# Patient Record
Sex: MALE | Race: WHITE | Employment: FULL TIME | URBAN - METROPOLITAN AREA
[De-identification: names, ages, dates, MRNs, and addresses within clinical notes are randomized per-mention and may not be internally consistent; named-entity substitution may affect disease eponyms.]

---

## 2021-08-03 ENCOUNTER — OFFICE VISIT (OUTPATIENT)
Dept: PRIMARY CARE CLINIC | Age: 56
End: 2021-08-03
Payer: COMMERCIAL

## 2021-08-03 VITALS
SYSTOLIC BLOOD PRESSURE: 134 MMHG | OXYGEN SATURATION: 98 % | BODY MASS INDEX: 26.82 KG/M2 | WEIGHT: 198 LBS | DIASTOLIC BLOOD PRESSURE: 92 MMHG | HEART RATE: 87 BPM | TEMPERATURE: 98.2 F | HEIGHT: 72 IN

## 2021-08-03 DIAGNOSIS — S82.55XA NONDISPLACED FRACTURE OF MEDIAL MALLEOLUS OF LEFT TIBIA, INITIAL ENCOUNTER FOR CLOSED FRACTURE: Primary | ICD-10-CM

## 2021-08-03 DIAGNOSIS — S99.912A INJURY OF LEFT ANKLE, INITIAL ENCOUNTER: ICD-10-CM

## 2021-08-03 DIAGNOSIS — S89.92XA INJURY OF LEFT LOWER EXTREMITY, INITIAL ENCOUNTER: ICD-10-CM

## 2021-08-03 PROCEDURE — 99203 OFFICE O/P NEW LOW 30 MIN: CPT | Performed by: NURSE PRACTITIONER

## 2021-08-03 PROCEDURE — 29515 APPLICATION SHORT LEG SPLINT: CPT | Performed by: NURSE PRACTITIONER

## 2021-08-03 ASSESSMENT — PATIENT HEALTH QUESTIONNAIRE - PHQ9
SUM OF ALL RESPONSES TO PHQ QUESTIONS 1-9: 0
SUM OF ALL RESPONSES TO PHQ QUESTIONS 1-9: 0
SUM OF ALL RESPONSES TO PHQ9 QUESTIONS 1 & 2: 0
2. FEELING DOWN, DEPRESSED OR HOPELESS: 0
SUM OF ALL RESPONSES TO PHQ QUESTIONS 1-9: 0
1. LITTLE INTEREST OR PLEASURE IN DOING THINGS: 0

## 2021-08-03 ASSESSMENT — ENCOUNTER SYMPTOMS
CHEST TIGHTNESS: 0
RESPIRATORY NEGATIVE: 1
SHORTNESS OF BREATH: 0
COUGH: 0
WHEEZING: 0

## 2021-08-03 NOTE — PROGRESS NOTES
[facility] [address] [facility] WALK IN CARE  [address] [address]  Dept: [phone]  Dept Fax: [phone]     Jam Ewing is a 50 y.o. male who presents to the urgent care today for his medicalconditions/complaints as noted below. Jam Ewing is c/o of Other (swelling and pain from the left knee down )    HPI:      Ankle Pain   Incident onset: 10am today. The injury mechanism was a fall (leg folded under him from a fall). The pain is present in the left ankle and left leg. The pain is moderate. Associated symptoms include an inability to bear weight. Pertinent negatives include no numbness or tingling. The symptoms are aggravated by movement, palpation and weight bearing. He has tried ice for the symptoms. The treatment provided no relief. Past Medical History:   Diagnosis Date    Anxiety       No current outpatient medications on file. No current facility-administered medications for this visit. No Known Allergies    Reviewed PMH, SH, and  with the patient and updated. Subjective:      Review of Systems   Constitutional: Negative for chills, fatigue and fever. Respiratory: Negative. Negative for cough, chest tightness, shortness of breath and wheezing. Cardiovascular: Negative. Negative for chest pain. Musculoskeletal: Positive for arthralgias (left ankle, left leg), gait problem and joint swelling (left ankle, left lower leg). Skin: Negative for rash. Neurological: Negative for tingling and numbness. All other systems reviewed and are negative. Objective:      Physical Exam  Vitals and nursing note reviewed. Constitutional:       General: He is not in acute distress. Appearance: He is well-developed. He is not diaphoretic. HENT:      Head: Normocephalic and atraumatic. Cardiovascular:      Rate and Rhythm: Normal rate and regular rhythm. Heart sounds: Normal heart sounds. No murmur heard.      Pulmonary:      Effort:

## 2021-08-03 NOTE — PATIENT INSTRUCTIONS
Patient Education        Wearing a Splint: Care Instructions  Your Care Instructions     A splint protects a broken bone or other injury. If you have a removable splint, follow your doctor's instructions and only remove the splint if your doctor says it's okay. Most splints can be adjusted. Your doctor will show you how to do this and will tell you when you might need to adjust the splint. A splint is sometimes called a brace. You may also hear it called an immobilizer. An immobilizer, such as a splint or cast, keeps you from moving the injured area. You may get a splint that's already factory-made. Or your doctor might make your splint from plaster or fiberglass. Some splints have a built-in air cushion. Air pads are inflated to hold the injured area in place. Follow-up care is a key part of your treatment and safety. Be sure to make and go to all appointments, and call your doctor if you are having problems. It's also a good idea to know your test results and keep a list of the medicines you take. How can you care for yourself at home? General care  · Follow your doctor's instructions on how much weight you can put on your injured limb. · If the fingers or toes on the limb with the splint were not injured, wiggle them every now and then. This helps move the blood and fluids in the injured limb. · Prop up the injured limb on a pillow when you ice it or anytime you sit or lie down during the next 3 days. Try to keep it above the level of your heart. This will help reduce swelling. · Put ice or cold packs on the limb for 10 to 20 minutes at a time. Try to do this every 1 to 2 hours for the next 3 days (when you are awake) or until the swelling goes down. Be careful not to get the splint wet. Put a thin cloth between the ice and your skin. If your splint is removable, ask your doctor if you can take it off when you use ice. · If you have an adjustable splint that feels too tight, loosen it slightly.   · Keep up your muscle strength and tone as much as you can while protecting your injured limb. Your doctor may want you to tense and relax the muscles protected by the splint. Check with your doctor or your physical or occupational therapist for instructions. Splint and skin care  · If your splint is not to be removed, try blowing cool air from a hair dryer or fan into the splint to help relieve itching. Never stick items under your splint to scratch the skin. · Do not use oils or lotions near your splint. If the skin becomes red or sore around the edge of the splint, you may pad the edges with a soft material, such as moleskin, or use tape to cover the edges. · If you're allowed to take your splint off, be sure your skin is dry before you put it back on. Be careful not to put the splint on too tightly. · Check the skin under the splint every day. If you can't remove the splint, check the skin around the edges. Tell your doctor if you see redness or sores. Water and your splint  · Keep your splint dry. Moisture can collect under the splint and cause skin irritation and itching. If you have a wound or have had surgery, moisture under the splint can increase the risk of infection. · Tape a sheet of plastic to cover your splint when you take a shower or bath, unless your doctor said you can take it off while bathing. · If you can take the splint off when you bathe, pat the area dry after bathing and put the splint back on.  · If your splint gets a little wet, you can dry it with a hair dryer. Use a \"cool\" setting. When should you call for help? Call your doctor now or seek immediate medical care if:    · You have increased or severe pain.     · You feel a warm or painful spot under the splint.     · You have problems with your splint. For example:  ? The skin under the splint is burning or stinging. ? The splint feels too tight. ? There is a lot of swelling near the splint. (Some swelling is normal.)  ?  You have a new fever. ? There is drainage or a bad smell coming from the splint.     · Your limb turns cold or changes color.     · You have trouble moving your fingers or toes.     · You have symptoms of a blood clot in your arm or leg (called a deep vein thrombosis). These may include:  ? Pain in the arm, calf, back of the knee, thigh, or groin. ? Redness and swelling in the arm, leg, or groin. Watch closely for changes in your health, and be sure to contact your doctor if:    · The splint is breaking apart or losing its shape.     · You are not getting better as expected. Where can you learn more? Go to https://Faraday.Skai. org and sign in to your SparkBase account. Enter U271 in the [facility] box to learn more about \"Wearing a Splint: Care Instructions. \"     If you do not have an account, please click on the \"Sign Up Now\" link. Current as of: November 16, 2020               Content Version: 12.9  © 2006-2021 [employer]. Care instructions adapted under license by [facility] ([facility]). If you have questions about a medical condition or this instruction, always ask your healthcare professional. [place] [address] any warranty or liability for your use of this information. Patient Education        Learning About RICE (Rest, Ice, Compression, and Elevation)  What is RICE? RICE is a way to care for an injury. RICE helps relieve pain and swelling. It may also help with healing and flexibility. RICE stands for:  · R est and protect the injured or sore area. · I ce or a cold pack used as soon as possible. · C ompression, or wrapping the injured or sore area with an elastic bandage. · E levation (propping up) the injured or sore area. How do you do RICE? You can use RICE for home treatment when you have general aches and pains or after an injury or surgery. Rest  · Do not put weight on the injury for at least 24 to 48 hours.   · Use crutches for a badly sprained knee or ankle. · Support a sprained wrist, elbow, or shoulder with a sling. Ice  · Put ice or a cold pack on the injury right away to reduce pain and swelling. Frozen vegetables will also work as an ice pack. Put a thin cloth between the ice or cold pack and your skin. The cloth protects the injured area from getting too cold. · Use ice for 10 to 15 minutes at a time for the first 48 to 72 hours. Compression  · Use compression for sprains, strains, and surgeries of the arms and legs. · Wrap the injured area with an elastic bandage or compression sleeve to reduce swelling. · Don't wrap it too tightly. If the area below it feels numb, tingles, or feels cool, loosen the wrap. Elevation  · Use elevation for areas of the body that can be propped up, such as arms and legs. · Prop up the injured area on pillows whenever you use ice. Keep it propped up anytime you sit or lie down. · Try to keep the injured area at or above the level of your heart. This will help reduce swelling and bruising. Where can you learn more? Go to https://WorkVoicespeDigital Assent.QBInternational. org and sign in to your [employer] account. Enter E645 in the [facility] box to learn more about \"Learning About RICE (Rest, Ice, Compression, and Elevation). \"     If you do not have an account, please click on the \"Sign Up Now\" link. Current as of: November 16, 2020               Content Version: 12.9  © 2006-2021 [employer]. Care instructions adapted under license by [facility] ([facility]). If you have questions about a medical condition or this instruction, always ask your healthcare professional. [place] [address] any warranty or liability for your use of this information. Patient Education        Broken Ankle: Care Instructions  Your Care Instructions     An ankle may break (fracture) during sports, a fall, or other accidents.  Fractures can range from a small, hairline crack, to a bone or bones broken into two or more pieces. Your treatment depends on how bad the break is. Your doctor may have put your ankle in a splint or cast to allow it to heal or to keep it stable until you see another doctor. It may take weeks or months for your ankle to heal. You can help your ankle heal with some care at home. You heal best when you take good care of yourself. Eat a variety of healthy foods, and don't smoke. You may have had a sedative to help you relax. You may be unsteady after having sedation. It can take a few hours for the medicine's effects to wear off. Common side effects of sedation include nausea, vomiting, and feeling sleepy or tired. The doctor has checked you carefully, but problems can develop later. If you notice any problems or new symptoms,  get medical treatment right away. Follow-up care is a key part of your treatment and safety. Be sure to make and go to all appointments, and call your doctor if you are having problems. It's also a good idea to know your test results and keep a list of the medicines you take. How can you care for yourself at home? · If the doctor gave you a sedative:  ? For 24 hours, don't do anything that requires attention to detail, such as going to work, making important decisions, or signing any legal documents. It takes time for the medicine's effects to completely wear off.  ? For your safety, do not drive or operate any machinery that could be dangerous. Wait until the medicine wears off and you can think clearly and react easily. · Put ice or a cold pack on your ankle for 10 to 20 minutes at a time. Try to do this every 1 to 2 hours for the next 3 days (when you are awake). Put a thin cloth between the ice and your cast or splint. Keep your cast or splint dry. · Follow the cast care instructions your doctor gives you. If you have a splint, do not take it off unless your doctor tells you to. · Be safe with medicines. Take pain medicines exactly as directed.   ? If the doctor gave you a prescription medicine for pain, take it as prescribed. ? If you are not taking a prescription pain medicine, ask your doctor if you can take an over-the-counter medicine. · Prop up your leg on pillows in the first few days after the injury. Keep the ankle higher than the level of your heart. This will help reduce swelling. · Do not put weight on your ankle unless your doctor tells you to. Use crutches to walk. · Follow instructions for exercises to keep your leg strong. · Wiggle your toes often to reduce swelling and stiffness. When should you call for help? Call 911 anytime you think you may need emergency care. For example, call if:    · You have chest pain, are short of breath, or you cough up blood.     · You are very sleepy and you have trouble waking up. Call your doctor now or seek immediate medical care if:    · You have new or worse nausea or vomiting.     · You have new or worse pain.     · Your foot is cool or pale or changes color.     · You have tingling, weakness, or numbness in your toes.     · Your cast or splint feels too tight.     · You have signs of a blood clot in your leg (called a deep vein thrombosis), such as:  ? Pain in your calf, back of the knee, thigh, or groin. ? Redness or swelling in your leg. Watch closely for changes in your health, and be sure to contact your doctor if:    · You have a problem with your splint or cast.     · You do not get better as expected. Where can you learn more? Go to https://Quero Rock.InCytu. org and sign in to your gokit account. Enter P816 in the [facility] box to learn more about \"Broken Ankle: Care Instructions. \"     If you do not have an account, please click on the \"Sign Up Now\" link. Current as of: November 16, 2020               Content Version: 12.9  © 4181-7057 [employer]. Care instructions adapted under license by [facility] [address]  If you have questions about a medical condition or this instruction, always ask your healthcare professional. [place] [address] any warranty or liability for your use of this information.

## 2021-08-05 ENCOUNTER — TELEPHONE (OUTPATIENT)
Dept: ORTHOPEDIC SURGERY | Age: 56
End: 2021-08-05

## 2021-08-05 NOTE — TELEPHONE ENCOUNTER
Patient was seen at [facility] 08/03 for Nondisplaced fracture of medial malleolus of left tibia. They placed a temporary cast and referred him to follow up with Dr Ambika Kidd. Dr Ambika Kidd schedule is very full, please call patient to help him get an appt.    Thank you

## 2021-08-06 ENCOUNTER — HOSPITAL ENCOUNTER (OUTPATIENT)
Dept: CT IMAGING | Age: 56
Discharge: HOME | End: 2021-08-08
Payer: COMMERCIAL

## 2021-08-06 ENCOUNTER — HOSPITAL ENCOUNTER (OUTPATIENT)
Dept: PHYSICAL THERAPY | Facility: CLINIC | Age: 56
Setting detail: THERAPIES SERIES
Discharge: HOME | End: 2021-08-06
Payer: COMMERCIAL

## 2021-08-06 ENCOUNTER — OFFICE VISIT (OUTPATIENT)
Dept: ORTHOPEDIC SURGERY | Age: 56
End: 2021-08-06
Payer: COMMERCIAL

## 2021-08-06 VITALS — HEIGHT: 72 IN | WEIGHT: 189 LBS | RESPIRATION RATE: 16 BRPM | BODY MASS INDEX: 25.6 KG/M2

## 2021-08-06 DIAGNOSIS — S82.892A CLOSED FRACTURE OF LEFT ANKLE, INITIAL ENCOUNTER: Primary | ICD-10-CM

## 2021-08-06 DIAGNOSIS — S93.432A SYNDESMOTIC DISRUPTION OF LEFT ANKLE, INITIAL ENCOUNTER: ICD-10-CM

## 2021-08-06 DIAGNOSIS — M25.572 ACUTE LEFT ANKLE PAIN: ICD-10-CM

## 2021-08-06 DIAGNOSIS — F17.200 TOBACCO DEPENDENCY: ICD-10-CM

## 2021-08-06 DIAGNOSIS — S82.862A CLOSED DISPLACED MAISONNEUVE FRACTURE OF LEFT LOWER EXTREMITY, INITIAL ENCOUNTER: ICD-10-CM

## 2021-08-06 DIAGNOSIS — M25.572 ACUTE LEFT ANKLE PAIN: Primary | ICD-10-CM

## 2021-08-06 DIAGNOSIS — M25.572 LEFT ANKLE PAIN, UNSPECIFIED CHRONICITY: Primary | ICD-10-CM

## 2021-08-06 PROCEDURE — 99205 OFFICE O/P NEW HI 60 MIN: CPT | Performed by: ORTHOPAEDIC SURGERY

## 2021-08-06 PROCEDURE — G8427 DOCREV CUR MEDS BY ELIG CLIN: HCPCS | Performed by: ORTHOPAEDIC SURGERY

## 2021-08-06 PROCEDURE — 73700 CT LOWER EXTREMITY W/O DYE: CPT

## 2021-08-06 PROCEDURE — 97116 GAIT TRAINING THERAPY: CPT

## 2021-08-06 PROCEDURE — G8419 CALC BMI OUT NRM PARAM NOF/U: HCPCS | Performed by: ORTHOPAEDIC SURGERY

## 2021-08-06 PROCEDURE — 97161 PT EVAL LOW COMPLEX 20 MIN: CPT

## 2021-08-06 PROCEDURE — 3017F COLORECTAL CA SCREEN DOC REV: CPT | Performed by: ORTHOPAEDIC SURGERY

## 2021-08-06 PROCEDURE — 4004F PT TOBACCO SCREEN RCVD TLK: CPT | Performed by: ORTHOPAEDIC SURGERY

## 2021-08-06 RX ORDER — HYDROCODONE BITARTRATE AND ACETAMINOPHEN 5; 325 MG/1; MG/1
1 TABLET ORAL EVERY 6 HOURS PRN
Qty: 10 TABLET | Refills: 0 | Status: ON HOLD | OUTPATIENT
Start: 2021-08-06 | End: 2021-08-11 | Stop reason: HOSPADM

## 2021-08-06 RX ORDER — ACETAMINOPHEN 500 MG
1000 TABLET ORAL ONCE
Status: DISCONTINUED | OUTPATIENT
Start: 2021-08-11 | End: 2021-08-06

## 2021-08-06 NOTE — CONSULTS
THE [facility]  The[facility]  [facility] [address]   [place]: [phone]  F: [phone]           Physical Therapy Evaluation    Date:  2021   Patient: Dianna Guzman  : 1965  MRN: [id number]  Physician: Dr Ruiz : Yossi Borges (VERIFICATION PENDING)  Medical Diagnosis: LLE ankle pain Rehab Codes: M25.572  Onset date: 8-3-21   Next Dr's appt. : -    Subjective:   CC/HPI: Pt with LLE ankle injury from dogs, 8-3-21 went to urgent care and sent to Dr Narciso Clifton. Casted today and plan for surgery 21 at [facility]. PMHx: See chart       Medications:  [x] Refer to full medical record [] None [] Other:  Allergies:       [x] Refer to full medical record [] None [] Other:        Martial Status Lives with wife, daughters   Home type 2   Stairs from outside 2   Stairs inside [address]   Job status Off post-op       Pain present? yes   Location LLE ankle   Pain Rating currently 7/10   Pain at worse 9/10   Pain at best 7/10   Description of pain sharp   Altered Sensation Intact    What makes it worse Movement    What makes it better rest           Objective:      Educated pt on use of DME, including: (Check box of device used)     [x] Knee Scooter: Instructed pt on appropriate height being even with contralateral knee. Reviewed safety concerns such as not gliding on turns and using breaks appropriately. [] Rolling Walker: Instructed pt on appropriate height of even with wrists with arms at resting at side. Educated pt on safe gait pattern while using walker. Explained to pt the difference between a 4 wheeled walker and rolling walker and how a rolling walker is most appropriate for a NWB pt. [x] Axillary Crutches: Instructed pt on appropriate height of two finger width between crutch and axilla, as well as elbow flexion of approximately 20deg. Educated pt on how to adjust both crutch and handle height.       [x] Stairs      Comments: Pt with good understanding of all techniques/uses and expressed no safety concerns. At this time pt will most benefit from use of crutches and knee scooter         Assessment:  STG: (to be met in 1 treatments)  1. Educate patient on proper use of DME   2. Patient to perform transfers and weight bearing status independent without assistance       Rehab Potential:  [x] Good  [] Fair  [] Poor   Suggested Professional Referral:  [x] No  [] Yes:  Barriers to Goal Achievement[de-identified]  [x] No  [] Yes:  Domestic Concerns:  [x] No  [] Yes:    Pt. Education:  [x] Plans/Goals, Risks/Benefits discussed  [] Home exercise program    Method of Education: [x] Verbal  [x] Demo  [] Written  Comprehension of Education:  [x] Verbalizes understanding. [x] Demonstrates understanding. Treatment Plan:  [x] Therapeutic Exercise      [] Manual Therapy       [x] Instruction in HEP        [] Neuromuscular Re-education     [] Vasocompression Mamhammad Delcid)        [x] Gait Training                      []  Medication allergies reviewed for use of    Dexamethasone Sodium Phosphate 4mg/ml     with iontophoresis treatments. Pt is not allergic. Frequency:  1 visit      Todays Treatment:    Educated patient on use of DME equipment crutches, knee scooter without any difficulty. Evaluation Complexity:  History (Personal factors, comorbidities) [] 0 [] 1-2 [] 3+   Exam (limitations, restrictions) [] 1-2 [] 3 [] 4+   Clinical presentation (progression) [] Stable [] Evolving  [] Unstable   Decision Making [] Low [] Moderate [] High    [x] Low Complexity [] Moderate Complexity [] High Complexity       The patient has been evaluated by Physical Therapy:     [x] He/She was able to demonstrate compliance with the relevant weight bearing restriction, and safe discharge to home is anticipated after surgery.      []  He/She was unable to demonstrate compliance with the relevant weight bearing restriction, and further sessions with PT are unlikely to help this; discharge to a SNF is anticipated after surgery. The following pieces of DME are mandatory for safe discharge to home:    [] rolling walker (2-wheel)    [x] crutches   [x] rolling knee scooter      [] wheelchair    [] other: ________________    The following pieces of DME are recommended as helpful but are optional:    [] rolling walker (2-wheel)   [] crutches    [] rolling knee scooter      [] wheelchair     [] other: ________________        Treatment Charges: Mins Units   [x] Evaluation       [x]  Low       []  Moderate       []  High 25 1   []  Modalities     []  Ther Exercise     []  Manual Therapy     []  Ther Activities     []  Aquatics     []  Vasocompression     [x]  Gait 10 1     TOTAL TREATMENT TIME: 40    Time in:0930   Time Out:1010    Electronically signed by: Benjamin Estrada PT        Physician Signature:________________________________Date:__________________  By signing above or cosigning this note, I have reviewed this plan of care and certify a need for medically necessary rehabilitation services.      *PLEASE SIGN ABOVE AND FAX BACK ALL PAGES*

## 2021-08-06 NOTE — PROGRESS NOTES
[facility]  [place] [address]  Dept: [phone]    Ambulatory Orthopedic Consult      CHIEF COMPLAINT:    Chief Complaint   Patient presents with    Ankle Pain     left       HISTORY OF PRESENT ILLNESS:      The patient is a 49 y.o. male who is being seen for evaluation of the above, which began 8/3/2021 secondary to a twisting injury after getting knocked over by his dogs  . At today's visit, he is using a splint/cast.     History is obtained today from:   [x]  the patient     [x]  EM[facility]     [x]  one family member/friend --patient's wife   []  multiple family members/friends    []  other:           REVIEW OF SYSTEMS:  Constitutional: Negative for fever. HENT: Negative for tinnitus. Eyes: Negative for pain. Respiratory: Negative for shortness of breath. Cardiovascular: Negative for chest pain. Gastrointestinal: Negative for abdominal pain. Genitourinary: Negative for dysuria. Skin: Negative for rash. Neurological: Negative for headaches. Hematological: Does not bruise/bleed easily. Musculoskeletal: See HPI for pertinent positives     Past Medical History:    He  has a past medical history of Anxiety. Past Surgical History:    He  has a past surgical history that includes hernia repair. Current Medications:     Current Outpatient Medications:     HYDROcodone-acetaminophen (NORCO) 5-325 MG per tablet, Take 1 tablet by mouth every 6 hours as needed for Pain for up to 7 days. Do not exceed 3000 mg of Acetaminophen in 24 hrs., Disp: 10 tablet, Rfl: 0    enoxaparin (LOVENOX) 40 MG/0.4ML injection, Inject 0.4 mLs into the skin daily, Disp: 42 Syringe, Rfl: 0    HYDROcodone-acetaminophen (NORCO) 5-325 MG per tablet, Take 1 tablet by mouth every 6 hours as needed for Pain for up to 7 days.  Do not exceed 3000 mg of Acetaminophen in 24 hrs., Disp: 10 tablet, Rfl: 0     Allergies:    Patient has no known allergies. Family History:  family history includes Other in his father. Social History:   Social History     Occupational History    Not on file   Tobacco Use    Smoking status: Never Smoker    Smokeless tobacco: Never Used   Substance and Sexual Activity    Alcohol use: Yes     Alcohol/week: 0.0 standard drinks    Drug use: No    Sexual activity: Not on file     Occupation: Full-time      OBJECTIVE:  Resp 16   Ht 6' (1.829 m)   Wt 189 lb (85.7 kg)   BMI 25.63 kg/m²    Psych: alert and oriented to person, time, and place   Cardio:  well perfused extremities, no cyanosis   Resp:  normal respiratory effort  Musculoskeletal:    Affected lower extremity:    Vascular: Limb well perfused, compartments soft/compressible. Skin: No erythema/ulcers. Intact. Neurovascular Status:  Grossly neurovascularly intact throughout  Motion:  Grossly able to fire major muscle groups with appropriate/expected AROM  Tenderness to Palpation: Diffusely about the ankle and proximal fibula  -Positive squeeze test for syndesmotic injury      RADIOLOGY:   8/6/2021 FINDINGS:  Three views (AP, Mortise, and Lateral) of the left ankle and three views (AP, Oblique, Lateral) of the left foot were obtained in the office today and reviewed, revealing displaced medial malleolus fracture, and posterior malleolus avulsion fracture with comminution. Healed fracture deformity of the third metatarsal.    IMPRESSION:  Osseous injury as above. Electronically signed by Goldie Rothman MD      Relevant previous imaging reviewed, both imaging and report(s) as below:    XR TIBIA FIBULA LEFT (2 VIEWS)    Result Date: 8/3/2021  Left tib fib: 1. Acute obliquely oriented proximal left fibular diaphyseal fracture. 2. Slightly displaced medial malleolus fracture with medial and anterior ankle soft tissue swelling. 3. Mild soft tissue edema of the left leg. Left ankle: 1.  Acute transversely oriented medial malleolus fracture with medial and anterior ankle soft tissue swelling. 2. Soft tissue swelling at the anterior left lower leg. Mild edema of the left leg. XR ANKLE LEFT (MIN 3 VIEWS)    Result Date: 8/3/2021  Left tib fib: 1. Acute obliquely oriented proximal left fibular diaphyseal fracture. 2. Slightly displaced medial malleolus fracture with medial and anterior ankle soft tissue swelling. 3. Mild soft tissue edema of the left leg. Left ankle: 1. Acute transversely oriented medial malleolus fracture with medial and anterior ankle soft tissue swelling. 2. Soft tissue swelling at the anterior left lower leg. Mild edema of the left leg. ASSESSMENT AND PLAN:  Body mass index is 25.63 kg/m². He has a left ankle fracture (Maisonneuve type injury with displaced medial malleolus fracture, comminuted posterior malleolus avulsion fracture, and proximal fibula fracture with syndesmotic disruption), sustained on 8/3/2021. Notably, he has the past medical history as above. He has a history of tobacco use (he reports that he vapes). We had a discussion today about the likely diagnosis and its natural history, physical exam and imaging findings, as well as various treatment options in detail. Surgically, we discussed a left ankle ORIF with syndesmotic fixation. Given the displacement and instability of this lesion, I did recommend surgery to help provide him a stable ankle decrease his risks of arthritis, but we did discuss the risks of arthritis even with surgical treatment. We discussed the expected postoperative course, including the relevant weightbearing restrictions and immobilization. We also discussed the patient's ability to return to work, as well as an estimate of the anticipated timeframe to return to work, in the context of their specific job functions/level of activity. No guarantees were made.      We did discuss his tobacco use in the context of surgery, and I did recommend tobacco cessation. We did discuss that his risks of postoperative complications and DVT/pulmonary embolus are higher given his tobacco use. Orders/referrals were placed as below at today's visit. The patient will avoid pain provoking activity. He was placed into a Reinoso splint, and counseled on swelling control. In order to know exactly how to proceed surgically, a CT was ordered today for preoperative planning. This is medically necessary to evaluate the exact bony alignment/architecture. At today's visit, the patient was ordered DME as below. I also ordered physical therapy for the patient to hep reinforce/teach the relevant weight bearing precautions, to help allow for safe transfers and early mobilization, as well as effectively utilize DME. Surgical booking paperwork was completed and submitted. We spoke about the risks/benefits/alternatives to a surgical intervention. They understand that the risks of surgery may include but are not limited to pain, infection, bleeding, blood clot, damage to soft tissue/vessel/nerve, future surgery, scarring/stiffness, decreased strength/weakness, cosmetic deformity, neuroma/neuritis/phantom pains, delayed soft tissue/bone healing, nonunion, malunion, failure of hardware/fixation/surgery, iatrogenic fracture/dislocation, damage to bone/joint(s), worsening of condition, recurrence, limb length discrepancy, avascular necrosis of bone, neurovascular compromise/compartment syndrome, tourniquet complications, failure of surgery, dissatisfaction with outcome, loss of limb, stroke, heart attack, pulmonary embolus, mental status change, anesthesia risks/reaction, and even death. They expressed verbal understanding of the risks and wish to proceed with surgical intervention. All questions were answered. No guarantees were made or implied. Informed consent was obtained. We also had a discussion about the risk of blood clot and thromboembolic events.  The patient understands that there is an increased risk with surgery/immobilization, and understands nothing will completely eliminate the risk of DVT/PE's, and that any prophylactic medication does not substitute for early mobilization. Given his risk profile, I have recommended the following strategy to decrease the risk of blood clots, and the patient agrees and wishes to proceed:  Lovenox 40 mg subQ q Day. --Lovenox was ordered for him at today's visit, and he will begin taking this right away, and we did discuss this, particularly again in the context of his tobacco use. -He was also ordered 10 tabs of hydrocodone for acute pain control. All questions were answered and the above plan was agreed upon. The patient will return to clinic postoperatively . At the patient's next visit, depending on how the patient is doing and/or new imaging/labs results, we may consider the following options:    []  Lace up ankle     []  CAM boot         []  removable wrist brace     []  PT:        []  Wean out immobilization         []  Adv activity      []  Footmind        []  Spenco       []  Custom Orthotic:               []  AZ brace                    []  Rocker Bottom      []  Night splint    []  Heel cups        []  Strap        []  Toe gizmos    []  Topl        []  NSAIDs         []  Rodolfo        []  Ref:         []  Stress Xray    []  CT        []  MRI  []  Inj:          []  Consider OR      []  Pick OR date    No follow-ups on file. No orders of the defined types were placed in this encounter. No orders of the defined types were placed in this encounter. Velma Palmer MD  Orthopedic Surgery        Please excuse any typos/errors, as this note was created with the assistance of voice recognition software. While intending to generate a document that actually reflects the content of the visit, the document can still have some errors including those of syntax and sound-a-like substitutions which may escape proof reading. In such instances, actual meaning can be extrapolated by context.

## 2021-08-06 NOTE — LETTER
Dr. Olvera Stains  [address] [facility]  [phone]        8/6/2021     Patient: Dianna Guzman  YOB: 1965    Dear Omar Carter,    I had the pleasure of seeing one of your patients, Dianna Guzman recently in the office. Below are the relevant portions of my assessment and plan of care. ASSESSMENT AND PLAN:  He has a left ankle fracture (Maisonneuve type injury with displaced medial malleolus fracture, comminuted posterior malleolus avulsion fracture, and proximal fibula fracture with syndesmotic disruption), sustained on 8/3/2021. Notably, he has the past medical history as above. He has a history of tobacco use (he reports that he vapes). We had a discussion today about the likely diagnosis and its natural history, physical exam and imaging findings, as well as various treatment options in detail. Surgically, we discussed a left ankle ORIF with syndesmotic fixation. Given the displacement and instability of this lesion, I did recommend surgery to help provide him a stable ankle decrease his risks of arthritis, but we did discuss the risks of arthritis even with surgical treatment. We discussed the expected postoperative course, including the relevant weightbearing restrictions and immobilization. We also discussed the patient's ability to return to work, as well as an estimate of the anticipated timeframe to return to work, in the context of their specific job functions/level of activity. No guarantees were made. We did discuss his tobacco use in the context of surgery, and I did recommend tobacco cessation. We did discuss that his risks of postoperative complications and DVT/pulmonary embolus are higher given his tobacco use. Orders/referrals were placed as below at today's visit. The patient will avoid pain provoking activity.   He was placed into a Reinoso splint, and counseled on swelling control. In order to know exactly how to proceed surgically, a CT was ordered today for preoperative planning. This is medically necessary to evaluate the exact bony alignment/architecture. At today's visit, the patient was ordered DME as below. I also ordered physical therapy for the patient to hep reinforce/teach the relevant weight bearing precautions, to help allow for safe transfers and early mobilization, as well as effectively utilize DME. Surgical booking paperwork was completed and submitted. We spoke about the risks/benefits/alternatives to a surgical intervention. They understand that the risks of surgery may include but are not limited to pain, infection, bleeding, blood clot, damage to soft tissue/vessel/nerve, future surgery, scarring/stiffness, decreased strength/weakness, cosmetic deformity, neuroma/neuritis/phantom pains, delayed soft tissue/bone healing, nonunion, malunion, failure of hardware/fixation/surgery, iatrogenic fracture/dislocation, damage to bone/joint(s), worsening of condition, recurrence, limb length discrepancy, avascular necrosis of bone, neurovascular compromise/compartment syndrome, tourniquet complications, failure of surgery, dissatisfaction with outcome, loss of limb, stroke, heart attack, pulmonary embolus, mental status change, anesthesia risks/reaction, and even death. They expressed verbal understanding of the risks and wish to proceed with surgical intervention. All questions were answered. No guarantees were made or implied. Informed consent was obtained. We also had a discussion about the risk of blood clot and thromboembolic events. The patient understands that there is an increased risk with surgery/immobilization, and understands nothing will completely eliminate the risk of DVT/PE's, and that any prophylactic medication does not substitute for early mobilization.  Given his risk profile, I have recommended the following strategy to decrease the risk of blood clots, and the patient agrees and wishes to proceed:  Lovenox 40 mg subQ q Day. --Lovenox was ordered for him at today's visit, and he will begin taking this right away. All questions were answered and the above plan was agreed upon. The patient will return to clinic postoperatively . I look forward to serving you and your patients again in the future. Please don't hesitate to contact me at my mobile number .         Cody Interiano MD  Orthopedic Surgery

## 2021-08-06 NOTE — LETTER
Dr. Anthony Celis  [address]  [phone]        8/6/2021     Patient: Sandra Hill  YOB: 1965    Dear Tommy Hardy,    I had the pleasure of seeing one of your patients, Sandra Hill, recently in the office. We have decided to proceed with surgery, and the patient may be reaching out to your office in the near future for medical clearance/optimization. Below are the relevant portions of my assessment and plan of care. ASSESSMENT AND PLAN:  He has a left ankle fracture (Maisonneuve type injury with displaced medial malleolus fracture, comminuted posterior malleolus avulsion fracture, and proximal fibula fracture with syndesmotic disruption), sustained on 8/3/2021. Notably, he has the past medical history as above. He has a history of tobacco use (he reports that he vapes). We had a discussion today about the likely diagnosis and its natural history, physical exam and imaging findings, as well as various treatment options in detail. Surgically, we discussed a left ankle ORIF with syndesmotic fixation. Given the displacement and instability of this lesion, I did recommend surgery to help provide him a stable ankle decrease his risks of arthritis, but we did discuss the risks of arthritis even with surgical treatment. We discussed the expected postoperative course, including the relevant weightbearing restrictions and immobilization. We also discussed the patient's ability to return to work, as well as an estimate of the anticipated timeframe to return to work, in the context of their specific job functions/level of activity. No guarantees were made. We did discuss his tobacco use in the context of surgery, and I did recommend tobacco cessation.   We did discuss that his risks of postoperative complications and DVT/pulmonary embolus are higher given his tobacco use.    Orders/referrals were placed as below at today's visit. The patient will avoid pain provoking activity. He was placed into a Reinoso splint, and counseled on swelling control. In order to know exactly how to proceed surgically, a CT was ordered today for preoperative planning. This is medically necessary to evaluate the exact bony alignment/architecture. At today's visit, the patient was ordered DME as below. I also ordered physical therapy for the patient to hep reinforce/teach the relevant weight bearing precautions, to help allow for safe transfers and early mobilization, as well as effectively utilize DME. Surgical booking paperwork was completed and submitted. We spoke about the risks/benefits/alternatives to a surgical intervention. They understand that the risks of surgery may include but are not limited to pain, infection, bleeding, blood clot, damage to soft tissue/vessel/nerve, future surgery, scarring/stiffness, decreased strength/weakness, cosmetic deformity, neuroma/neuritis/phantom pains, delayed soft tissue/bone healing, nonunion, malunion, failure of hardware/fixation/surgery, iatrogenic fracture/dislocation, damage to bone/joint(s), worsening of condition, recurrence, limb length discrepancy, avascular necrosis of bone, neurovascular compromise/compartment syndrome, tourniquet complications, failure of surgery, dissatisfaction with outcome, loss of limb, stroke, heart attack, pulmonary embolus, mental status change, anesthesia risks/reaction, and even death. They expressed verbal understanding of the risks and wish to proceed with surgical intervention. All questions were answered. No guarantees were made or implied. Informed consent was obtained. We also had a discussion about the risk of blood clot and thromboembolic events.  The patient understands that there is an increased risk with surgery/immobilization, and understands nothing will completely eliminate the risk of

## 2021-08-07 ENCOUNTER — HOSPITAL ENCOUNTER (OUTPATIENT)
Dept: LAB | Age: 56
Setting detail: SPECIMEN
Discharge: HOME | End: 2021-08-07
Payer: COMMERCIAL

## 2021-08-07 PROCEDURE — U0005 INFEC AGEN DETEC AMPLI PROBE: HCPCS

## 2021-08-07 PROCEDURE — U0003 INFECTIOUS AGENT DETECTION BY NUCLEIC ACID (DNA OR RNA); SEVERE ACUTE RESPIRATORY SYNDROME CORONAVIRUS 2 (SARS-COV-2) (CORONAVIRUS DISEASE [COVID-19]), AMPLIFIED PROBE TECHNIQUE, MAKING USE OF HIGH THROUGHPUT TECHNOLOGIES AS DESCRIBED BY CMS-2020-01-R: HCPCS

## 2021-08-08 LAB
SARS-COV-2: NORMAL
SARS-COV-2: NOT DETECTED
SOURCE: NORMAL

## 2021-08-11 ENCOUNTER — ANESTHESIA (OUTPATIENT)
Dept: OPERATING ROOM | Age: 56
End: 2021-08-11
Payer: COMMERCIAL

## 2021-08-11 ENCOUNTER — ANESTHESIA EVENT (OUTPATIENT)
Dept: OPERATING ROOM | Age: 56
End: 2021-08-11
Payer: COMMERCIAL

## 2021-08-11 ENCOUNTER — APPOINTMENT (OUTPATIENT)
Dept: GENERAL RADIOLOGY | Age: 56
End: 2021-08-11
Attending: ORTHOPAEDIC SURGERY
Payer: COMMERCIAL

## 2021-08-11 ENCOUNTER — HOSPITAL ENCOUNTER (OUTPATIENT)
Age: 56
Setting detail: OUTPATIENT SURGERY
Discharge: HOME | End: 2021-08-11
Attending: ORTHOPAEDIC SURGERY | Admitting: ORTHOPAEDIC SURGERY
Payer: COMMERCIAL

## 2021-08-11 VITALS — TEMPERATURE: 96.3 F | SYSTOLIC BLOOD PRESSURE: 153 MMHG | DIASTOLIC BLOOD PRESSURE: 94 MMHG | OXYGEN SATURATION: 100 %

## 2021-08-11 VITALS
TEMPERATURE: 96.6 F | SYSTOLIC BLOOD PRESSURE: 140 MMHG | HEIGHT: 72 IN | HEART RATE: 79 BPM | RESPIRATION RATE: 16 BRPM | WEIGHT: 200 LBS | BODY MASS INDEX: 27.09 KG/M2 | OXYGEN SATURATION: 94 % | DIASTOLIC BLOOD PRESSURE: 93 MMHG

## 2021-08-11 DIAGNOSIS — S82.842A CLOSED BIMALLEOLAR FRACTURE OF LEFT ANKLE, INITIAL ENCOUNTER: Primary | ICD-10-CM

## 2021-08-11 PROCEDURE — 6360000002 HC RX W HCPCS: Performed by: NURSE ANESTHETIST, CERTIFIED REGISTERED

## 2021-08-11 PROCEDURE — 6370000000 HC RX 637 (ALT 250 FOR IP): Performed by: ANESTHESIOLOGY

## 2021-08-11 PROCEDURE — 7100000011 HC PHASE II RECOVERY - ADDTL 15 MIN: Performed by: ORTHOPAEDIC SURGERY

## 2021-08-11 PROCEDURE — 3700000000 HC ANESTHESIA ATTENDED CARE: Performed by: ORTHOPAEDIC SURGERY

## 2021-08-11 PROCEDURE — 27829 TREAT LOWER LEG JOINT: CPT | Performed by: ORTHOPAEDIC SURGERY

## 2021-08-11 PROCEDURE — 2580000003 HC RX 258: Performed by: ANESTHESIOLOGY

## 2021-08-11 PROCEDURE — 6360000002 HC RX W HCPCS: Performed by: ANESTHESIOLOGY

## 2021-08-11 PROCEDURE — 3209999900 FLUORO FOR SURGICAL PROCEDURES

## 2021-08-11 PROCEDURE — 6360000002 HC RX W HCPCS: Performed by: ORTHOPAEDIC SURGERY

## 2021-08-11 PROCEDURE — 2709999900 HC NON-CHARGEABLE SUPPLY: Performed by: ORTHOPAEDIC SURGERY

## 2021-08-11 PROCEDURE — 7100000001 HC PACU RECOVERY - ADDTL 15 MIN: Performed by: ORTHOPAEDIC SURGERY

## 2021-08-11 PROCEDURE — 3600000012 HC SURGERY LEVEL 2 ADDTL 15MIN: Performed by: ORTHOPAEDIC SURGERY

## 2021-08-11 PROCEDURE — 3600000002 HC SURGERY LEVEL 2 BASE: Performed by: ORTHOPAEDIC SURGERY

## 2021-08-11 PROCEDURE — C1769 GUIDE WIRE: HCPCS | Performed by: ORTHOPAEDIC SURGERY

## 2021-08-11 PROCEDURE — 3700000001 HC ADD 15 MINUTES (ANESTHESIA): Performed by: ORTHOPAEDIC SURGERY

## 2021-08-11 PROCEDURE — 64445 NJX AA&/STRD SCIATIC NRV IMG: CPT | Performed by: ANESTHESIOLOGY

## 2021-08-11 PROCEDURE — 2500000003 HC RX 250 WO HCPCS: Performed by: NURSE ANESTHETIST, CERTIFIED REGISTERED

## 2021-08-11 PROCEDURE — 2720000010 HC SURG SUPPLY STERILE: Performed by: ORTHOPAEDIC SURGERY

## 2021-08-11 PROCEDURE — 7100000000 HC PACU RECOVERY - FIRST 15 MIN: Performed by: ORTHOPAEDIC SURGERY

## 2021-08-11 PROCEDURE — 27766 OPTX MEDIAL ANKLE FX: CPT | Performed by: ORTHOPAEDIC SURGERY

## 2021-08-11 PROCEDURE — C1713 ANCHOR/SCREW BN/BN,TIS/BN: HCPCS | Performed by: ORTHOPAEDIC SURGERY

## 2021-08-11 PROCEDURE — 6370000000 HC RX 637 (ALT 250 FOR IP): Performed by: ORTHOPAEDIC SURGERY

## 2021-08-11 PROCEDURE — 7100000010 HC PHASE II RECOVERY - FIRST 15 MIN: Performed by: ORTHOPAEDIC SURGERY

## 2021-08-11 PROCEDURE — 2500000003 HC RX 250 WO HCPCS: Performed by: ANESTHESIOLOGY

## 2021-08-11 DEVICE — ONE-THIRD TUBULAR PLATE: Type: IMPLANTABLE DEVICE | Site: ANKLE | Status: FUNCTIONAL

## 2021-08-11 DEVICE — SYSTEM IMPL TI UHMWPE KNOTLESS FOR SYNDESMOSIS FIX: Type: IMPLANTABLE DEVICE | Site: ANKLE | Status: FUNCTIONAL

## 2021-08-11 DEVICE — CANNULATED SCREW
Type: IMPLANTABLE DEVICE | Site: ANKLE | Status: FUNCTIONAL
Brand: ASNIS

## 2021-08-11 RX ORDER — OXYCODONE HYDROCHLORIDE AND ACETAMINOPHEN 5; 325 MG/1; MG/1
1 TABLET ORAL EVERY 6 HOURS PRN
Qty: 20 TABLET | Refills: 0 | Status: SHIPPED | OUTPATIENT
Start: 2021-08-11 | End: 2021-08-18

## 2021-08-11 RX ORDER — SULFAMETHOXAZOLE AND TRIMETHOPRIM 800; 160 MG/1; MG/1
1 TABLET ORAL 2 TIMES DAILY
Qty: 10 TABLET | Refills: 0 | Status: SHIPPED | OUTPATIENT
Start: 2021-08-11 | End: 2021-08-16

## 2021-08-11 RX ORDER — SODIUM CHLORIDE 0.9 % (FLUSH) 0.9 %
10 SYRINGE (ML) INJECTION PRN
Status: DISCONTINUED | OUTPATIENT
Start: 2021-08-11 | End: 2021-08-11 | Stop reason: HOSPADM

## 2021-08-11 RX ORDER — HYDROCODONE BITARTRATE AND ACETAMINOPHEN 5; 325 MG/1; MG/1
1 TABLET ORAL PRN
Status: DISCONTINUED | OUTPATIENT
Start: 2021-08-11 | End: 2021-08-11 | Stop reason: HOSPADM

## 2021-08-11 RX ORDER — ONDANSETRON 2 MG/ML
4 INJECTION INTRAMUSCULAR; INTRAVENOUS
Status: DISCONTINUED | OUTPATIENT
Start: 2021-08-11 | End: 2021-08-11 | Stop reason: HOSPADM

## 2021-08-11 RX ORDER — DEXAMETHASONE SODIUM PHOSPHATE 10 MG/ML
INJECTION, SOLUTION INTRAMUSCULAR; INTRAVENOUS PRN
Status: DISCONTINUED | OUTPATIENT
Start: 2021-08-11 | End: 2021-08-11 | Stop reason: SDUPTHER

## 2021-08-11 RX ORDER — SODIUM CHLORIDE, SODIUM LACTATE, POTASSIUM CHLORIDE, CALCIUM CHLORIDE 600; 310; 30; 20 MG/100ML; MG/100ML; MG/100ML; MG/100ML
INJECTION, SOLUTION INTRAVENOUS CONTINUOUS
Status: DISCONTINUED | OUTPATIENT
Start: 2021-08-11 | End: 2021-08-11 | Stop reason: HOSPADM

## 2021-08-11 RX ORDER — LIDOCAINE HYDROCHLORIDE 10 MG/ML
INJECTION, SOLUTION INFILTRATION; PERINEURAL
Status: DISCONTINUED | OUTPATIENT
Start: 2021-08-11 | End: 2021-08-11 | Stop reason: SDUPTHER

## 2021-08-11 RX ORDER — LIDOCAINE HYDROCHLORIDE 20 MG/ML
INJECTION, SOLUTION INTRAVENOUS PRN
Status: DISCONTINUED | OUTPATIENT
Start: 2021-08-11 | End: 2021-08-11 | Stop reason: SDUPTHER

## 2021-08-11 RX ORDER — ACETAMINOPHEN 500 MG
1000 TABLET ORAL ONCE
Status: COMPLETED | OUTPATIENT
Start: 2021-08-11 | End: 2021-08-11

## 2021-08-11 RX ORDER — PROPOFOL 10 MG/ML
INJECTION, EMULSION INTRAVENOUS PRN
Status: DISCONTINUED | OUTPATIENT
Start: 2021-08-11 | End: 2021-08-11 | Stop reason: SDUPTHER

## 2021-08-11 RX ORDER — GLYCOPYRROLATE 1 MG/5 ML
SYRINGE (ML) INTRAVENOUS PRN
Status: DISCONTINUED | OUTPATIENT
Start: 2021-08-11 | End: 2021-08-11 | Stop reason: SDUPTHER

## 2021-08-11 RX ORDER — ROPIVACAINE HYDROCHLORIDE 5 MG/ML
INJECTION, SOLUTION EPIDURAL; INFILTRATION; PERINEURAL
Status: DISCONTINUED | OUTPATIENT
Start: 2021-08-11 | End: 2021-08-11 | Stop reason: SDUPTHER

## 2021-08-11 RX ORDER — FENTANYL CITRATE 50 UG/ML
INJECTION, SOLUTION INTRAMUSCULAR; INTRAVENOUS PRN
Status: DISCONTINUED | OUTPATIENT
Start: 2021-08-11 | End: 2021-08-11 | Stop reason: SDUPTHER

## 2021-08-11 RX ORDER — FENTANYL CITRATE 50 UG/ML
25 INJECTION, SOLUTION INTRAMUSCULAR; INTRAVENOUS EVERY 5 MIN PRN
Status: DISCONTINUED | OUTPATIENT
Start: 2021-08-11 | End: 2021-08-11 | Stop reason: HOSPADM

## 2021-08-11 RX ORDER — LIDOCAINE HYDROCHLORIDE 10 MG/ML
1 INJECTION, SOLUTION EPIDURAL; INFILTRATION; INTRACAUDAL; PERINEURAL
Status: DISCONTINUED | OUTPATIENT
Start: 2021-08-11 | End: 2021-08-11 | Stop reason: HOSPADM

## 2021-08-11 RX ORDER — FENTANYL CITRATE 50 UG/ML
50 INJECTION, SOLUTION INTRAMUSCULAR; INTRAVENOUS EVERY 5 MIN PRN
Status: DISCONTINUED | OUTPATIENT
Start: 2021-08-11 | End: 2021-08-11 | Stop reason: HOSPADM

## 2021-08-11 RX ORDER — MIDAZOLAM HYDROCHLORIDE 1 MG/ML
INJECTION INTRAMUSCULAR; INTRAVENOUS PRN
Status: DISCONTINUED | OUTPATIENT
Start: 2021-08-11 | End: 2021-08-11 | Stop reason: SDUPTHER

## 2021-08-11 RX ORDER — ONDANSETRON 2 MG/ML
INJECTION INTRAMUSCULAR; INTRAVENOUS PRN
Status: DISCONTINUED | OUTPATIENT
Start: 2021-08-11 | End: 2021-08-11 | Stop reason: SDUPTHER

## 2021-08-11 RX ORDER — SODIUM CHLORIDE 9 MG/ML
25 INJECTION, SOLUTION INTRAVENOUS PRN
Status: DISCONTINUED | OUTPATIENT
Start: 2021-08-11 | End: 2021-08-11 | Stop reason: HOSPADM

## 2021-08-11 RX ORDER — PHENYLEPHRINE HCL IN 0.9% NACL 1 MG/10 ML
SYRINGE (ML) INTRAVENOUS PRN
Status: DISCONTINUED | OUTPATIENT
Start: 2021-08-11 | End: 2021-08-11 | Stop reason: SDUPTHER

## 2021-08-11 RX ORDER — DOCUSATE SODIUM 100 MG/1
100 CAPSULE, LIQUID FILLED ORAL 2 TIMES DAILY PRN
Qty: 20 CAPSULE | Refills: 0 | Status: SHIPPED | OUTPATIENT
Start: 2021-08-11 | End: 2021-08-18

## 2021-08-11 RX ORDER — SODIUM CHLORIDE 9 MG/ML
INJECTION, SOLUTION INTRAVENOUS CONTINUOUS
Status: DISCONTINUED | OUTPATIENT
Start: 2021-08-11 | End: 2021-08-11 | Stop reason: HOSPADM

## 2021-08-11 RX ORDER — NEOSTIGMINE METHYLSULFATE 5 MG/5 ML
SYRINGE (ML) INTRAVENOUS PRN
Status: DISCONTINUED | OUTPATIENT
Start: 2021-08-11 | End: 2021-08-11 | Stop reason: SDUPTHER

## 2021-08-11 RX ORDER — HYDROCODONE BITARTRATE AND ACETAMINOPHEN 5; 325 MG/1; MG/1
2 TABLET ORAL PRN
Status: DISCONTINUED | OUTPATIENT
Start: 2021-08-11 | End: 2021-08-11 | Stop reason: HOSPADM

## 2021-08-11 RX ORDER — GINSENG 100 MG
CAPSULE ORAL PRN
Status: DISCONTINUED | OUTPATIENT
Start: 2021-08-11 | End: 2021-08-11 | Stop reason: ALTCHOICE

## 2021-08-11 RX ORDER — SODIUM CHLORIDE 0.9 % (FLUSH) 0.9 %
10 SYRINGE (ML) INJECTION EVERY 12 HOURS SCHEDULED
Status: DISCONTINUED | OUTPATIENT
Start: 2021-08-11 | End: 2021-08-11 | Stop reason: HOSPADM

## 2021-08-11 RX ORDER — ROCURONIUM BROMIDE 10 MG/ML
INJECTION, SOLUTION INTRAVENOUS PRN
Status: DISCONTINUED | OUTPATIENT
Start: 2021-08-11 | End: 2021-08-11 | Stop reason: SDUPTHER

## 2021-08-11 RX ORDER — ONDANSETRON 4 MG/1
4 TABLET, FILM COATED ORAL EVERY 8 HOURS PRN
Qty: 20 TABLET | Refills: 0 | Status: SHIPPED | OUTPATIENT
Start: 2021-08-11 | End: 2021-08-18

## 2021-08-11 RX ADMIN — CEFAZOLIN 2000 MG: 10 INJECTION, POWDER, FOR SOLUTION INTRAVENOUS at 07:46

## 2021-08-11 RX ADMIN — MIDAZOLAM 2 MG: 1 INJECTION INTRAMUSCULAR; INTRAVENOUS at 07:24

## 2021-08-11 RX ADMIN — PROPOFOL 20 MG: 10 INJECTION, EMULSION INTRAVENOUS at 09:06

## 2021-08-11 RX ADMIN — FENTANYL CITRATE 50 MCG: 50 INJECTION, SOLUTION INTRAMUSCULAR; INTRAVENOUS at 09:45

## 2021-08-11 RX ADMIN — PROPOFOL 180 MG: 10 INJECTION, EMULSION INTRAVENOUS at 07:37

## 2021-08-11 RX ADMIN — FENTANYL CITRATE 50 MCG: 50 INJECTION, SOLUTION INTRAMUSCULAR; INTRAVENOUS at 09:30

## 2021-08-11 RX ADMIN — Medication 3 MG: at 08:46

## 2021-08-11 RX ADMIN — LIDOCAINE HYDROCHLORIDE 5 ML: 10 INJECTION, SOLUTION INFILTRATION; PERINEURAL at 10:44

## 2021-08-11 RX ADMIN — Medication 0.6 MG: at 08:46

## 2021-08-11 RX ADMIN — FENTANYL CITRATE 50 MCG: 50 INJECTION, SOLUTION INTRAMUSCULAR; INTRAVENOUS at 09:09

## 2021-08-11 RX ADMIN — SODIUM CHLORIDE, POTASSIUM CHLORIDE, SODIUM LACTATE AND CALCIUM CHLORIDE: 600; 310; 30; 20 INJECTION, SOLUTION INTRAVENOUS at 06:15

## 2021-08-11 RX ADMIN — ONDANSETRON 4 MG: 2 INJECTION, SOLUTION INTRAMUSCULAR; INTRAVENOUS at 07:24

## 2021-08-11 RX ADMIN — LIDOCAINE HYDROCHLORIDE 100 MG: 20 INJECTION, SOLUTION INTRAVENOUS at 07:37

## 2021-08-11 RX ADMIN — ROPIVACAINE HYDROCHLORIDE 40 ML: 5 INJECTION, SOLUTION EPIDURAL; INFILTRATION; PERINEURAL at 10:44

## 2021-08-11 RX ADMIN — FENTANYL CITRATE 50 MCG: 50 INJECTION, SOLUTION INTRAMUSCULAR; INTRAVENOUS at 08:59

## 2021-08-11 RX ADMIN — ROCURONIUM BROMIDE 50 MG: 10 INJECTION, SOLUTION INTRAVENOUS at 07:37

## 2021-08-11 RX ADMIN — FENTANYL CITRATE 50 MCG: 50 INJECTION, SOLUTION INTRAMUSCULAR; INTRAVENOUS at 08:39

## 2021-08-11 RX ADMIN — FENTANYL CITRATE 50 MCG: 50 INJECTION, SOLUTION INTRAMUSCULAR; INTRAVENOUS at 09:19

## 2021-08-11 RX ADMIN — FENTANYL CITRATE 100 MCG: 50 INJECTION, SOLUTION INTRAMUSCULAR; INTRAVENOUS at 07:37

## 2021-08-11 RX ADMIN — Medication 100 MCG: at 07:48

## 2021-08-11 RX ADMIN — DEXAMETHASONE SODIUM PHOSPHATE 10 MG: 10 INJECTION INTRAMUSCULAR; INTRAVENOUS at 07:46

## 2021-08-11 RX ADMIN — SODIUM CHLORIDE, POTASSIUM CHLORIDE, SODIUM LACTATE AND CALCIUM CHLORIDE: 600; 310; 30; 20 INJECTION, SOLUTION INTRAVENOUS at 08:46

## 2021-08-11 RX ADMIN — FENTANYL CITRATE 50 MCG: 50 INJECTION, SOLUTION INTRAMUSCULAR; INTRAVENOUS at 08:05

## 2021-08-11 RX ADMIN — FENTANYL CITRATE 50 MCG: 50 INJECTION, SOLUTION INTRAMUSCULAR; INTRAVENOUS at 10:00

## 2021-08-11 RX ADMIN — ACETAMINOPHEN 1000 MG: 500 TABLET ORAL at 06:48

## 2021-08-11 ASSESSMENT — PULMONARY FUNCTION TESTS
PIF_VALUE: 19
PIF_VALUE: 19
PIF_VALUE: 20
PIF_VALUE: 20
PIF_VALUE: 19
PIF_VALUE: 19
PIF_VALUE: 2
PIF_VALUE: 23
PIF_VALUE: 18
PIF_VALUE: 18
PIF_VALUE: 0
PIF_VALUE: 5
PIF_VALUE: 20
PIF_VALUE: 20
PIF_VALUE: 18
PIF_VALUE: 20
PIF_VALUE: 19
PIF_VALUE: 18
PIF_VALUE: 0
PIF_VALUE: 20
PIF_VALUE: 1
PIF_VALUE: 23
PIF_VALUE: 19
PIF_VALUE: 2
PIF_VALUE: 18
PIF_VALUE: 19
PIF_VALUE: 1
PIF_VALUE: 2
PIF_VALUE: 1
PIF_VALUE: 20
PIF_VALUE: 14
PIF_VALUE: 17
PIF_VALUE: 2
PIF_VALUE: 1
PIF_VALUE: 17
PIF_VALUE: 19
PIF_VALUE: 23
PIF_VALUE: 2
PIF_VALUE: 2
PIF_VALUE: 19
PIF_VALUE: 6
PIF_VALUE: 23
PIF_VALUE: 19
PIF_VALUE: 3
PIF_VALUE: 20
PIF_VALUE: 23
PIF_VALUE: 18
PIF_VALUE: 19
PIF_VALUE: 19
PIF_VALUE: 0
PIF_VALUE: 2
PIF_VALUE: 20
PIF_VALUE: 19
PIF_VALUE: 17
PIF_VALUE: 17
PIF_VALUE: 18
PIF_VALUE: 19
PIF_VALUE: 19
PIF_VALUE: 23
PIF_VALUE: 1
PIF_VALUE: 23
PIF_VALUE: 23
PIF_VALUE: 20
PIF_VALUE: 19
PIF_VALUE: 3
PIF_VALUE: 18
PIF_VALUE: 14
PIF_VALUE: 2
PIF_VALUE: 22
PIF_VALUE: 17
PIF_VALUE: 1
PIF_VALUE: 22
PIF_VALUE: 20
PIF_VALUE: 18
PIF_VALUE: 19
PIF_VALUE: 1
PIF_VALUE: 19
PIF_VALUE: 18
PIF_VALUE: 18
PIF_VALUE: 19
PIF_VALUE: 22
PIF_VALUE: 19
PIF_VALUE: 20
PIF_VALUE: 19
PIF_VALUE: 20
PIF_VALUE: 19
PIF_VALUE: 19
PIF_VALUE: 1
PIF_VALUE: 19
PIF_VALUE: 18
PIF_VALUE: 23
PIF_VALUE: 1
PIF_VALUE: 18
PIF_VALUE: 3
PIF_VALUE: 23
PIF_VALUE: 19
PIF_VALUE: 18
PIF_VALUE: 19
PIF_VALUE: 5
PIF_VALUE: 1
PIF_VALUE: 20
PIF_VALUE: 19
PIF_VALUE: 19
PIF_VALUE: 18

## 2021-08-11 ASSESSMENT — PAIN SCALES - GENERAL
PAINLEVEL_OUTOF10: 9
PAINLEVEL_OUTOF10: 1
PAINLEVEL_OUTOF10: 7
PAINLEVEL_OUTOF10: 10
PAINLEVEL_OUTOF10: 8

## 2021-08-11 ASSESSMENT — PAIN DESCRIPTION - LOCATION: LOCATION: ANKLE

## 2021-08-11 ASSESSMENT — PAIN DESCRIPTION - DESCRIPTORS
DESCRIPTORS: SHARP;JABBING
DESCRIPTORS: ACHING;SHARP

## 2021-08-11 ASSESSMENT — PAIN DESCRIPTION - PAIN TYPE: TYPE: SURGICAL PAIN

## 2021-08-11 ASSESSMENT — PAIN - FUNCTIONAL ASSESSMENT: PAIN_FUNCTIONAL_ASSESSMENT: 0-10

## 2021-08-11 NOTE — PROGRESS NOTES
I spoke to the patient in the preoperative area, and the operative site was identified, verified and marked. The procedure was verified. We have reviewed the risks, benefits, and alternatives to the procedure. No guarantees were made. I have also reviewed the history and physical. There are no significant changes in medical history. All questions were answered and they wish to proceed as planned.      Electronically signed by Boaz Fishman MD

## 2021-08-11 NOTE — ANESTHESIA PROCEDURE NOTES
Peripheral Block    Patient location during procedure: post-op  Start time: 8/11/2021 10:24 AM  End time: 8/11/2021 10:34 AM  Staffing  Performed: anesthesiologist   Anesthesiologist: Sakina Mo MD  Preanesthetic Checklist  Completed: patient identified, IV checked, site marked, risks and benefits discussed, surgical consent, monitors and equipment checked, pre-op evaluation, timeout performed, anesthesia consent given, oxygen available and patient being monitored  Peripheral Block  Prep: ChloraPrep  Patient monitoring: cardiac monitor, continuous pulse ox, frequent blood pressure checks and IV access  Block type: Sciatic and Saphenous  Laterality: left  Injection technique: single-shot  Guidance: ultrasound guided  Local infiltration: lidocaine  Infiltration strength: 1 %  Dose: 3 mL  Provider prep: mask and sterile gloves  Local infiltration: lidocaine  Needle  Needle gauge: 21 G  Needle length: 10 cm  Needle localization: ultrasound guidance  Assessment  Injection assessment: negative aspiration for heme, no paresthesia on injection and local visualized surrounding nerve on ultrasound  Paresthesia pain: none  Slow fractionated injection: yes  Hemodynamics: stable  Additional Notes  U/S [id number].  (1) Under ultrasound guidance, a  gauge needle was inserted and placed in close proximity to the  nerve.  (2) Ultrasound was also used to visualize the spread of the anesthetic in close proximity to the nerve being blocked. (3) The nerve appeared anatomically normal, and (4 there were no apparent abnormal pathological findings on the image that were readily visible and related to the nerve being blocked. (5) A permanent ultrasound image was saved in the patient's record.       25 ml for popliteal  15 for adductor canal      Medications Administered  Lidocaine injection 1%, 5 mL  ropivacaine (NAROPIN) injection 0.5%, 40 mL  Reason for block: post-op pain management and at surgeon's request

## 2021-08-11 NOTE — ANESTHESIA PRE PROCEDURE
Department of Anesthesiology  Preprocedure Note       Name:  Prakash Atkinson   Age:  49 y.o.  :  1965                                          MRN:  [id number]         Date:  2021      Surgeon: Micki Nettles):  Ba Bell MD    Procedure: Procedure(s):  LEFT ANKLE OPEN REDUCTION INTERNAL FIXATION           LEFT SYNDESMOSIS ORIF - DIANDRA    ARTH[employer]X    Medications prior to admission:   Prior to Admission medications    Medication Sig Start Date End Date Taking? Authorizing Provider   HYDROcodone-acetaminophen (NORCO) 5-325 MG per tablet Take 1 tablet by mouth every 6 hours as needed for Pain for up to 7 days. Do not exceed 3000 mg of Acetaminophen in 24 hrs. 21  Ba Bell MD   enoxaparin (LOVENOX) 40 MG/0.4ML injection Inject 0.4 mLs into the skin daily 21   Ba Bell MD   HYDROcodone-acetaminophen (NORCO) 5-325 MG per tablet Take 1 tablet by mouth every 6 hours as needed for Pain for up to 7 days.  Do not exceed 3000 mg of Acetaminophen in 24 hrs. 21  Ba Bell MD       Current medications:    Current Facility-Administered Medications   Medication Dose Route Frequency Provider Last Rate Last Admin    ceFAZolin (ANCEF) 2000 mg in dextrose 5 % 50 mL IVPB  2,000 mg Intravenous Once Ba Bell MD        0.9 % sodium chloride infusion   Intravenous Continuous Bruno Lund MD        lactated ringers infusion   Intravenous Continuous Bruno Lund  mL/hr at 21 0615 New Bag at 21 0615    sodium chloride flush 0.9 % injection 10 mL  10 mL Intravenous 2 times per day Bruno Lund MD        sodium chloride flush 0.9 % injection 10 mL  10 mL Intravenous PRN Bruno Lund MD        0.9 % sodium chloride infusion  25 mL Intravenous PRN Bruno Lund MD        lidocaine PF 1 % injection 1 mL  1 mL Intradermal Once PRN Bruno Lund MD           Allergies:  No Known Allergies    Problem List:    Patient Active Problem List Diagnosis Code    Anxiety F41.9       Past Medical History:        Diagnosis Date    Anxiety        Past Surgical History:        Procedure Laterality Date    HERNIA REPAIR      As a child       Social History:    Social History     Tobacco Use    Smoking status: Former Smoker     Types: Cigarettes    Smokeless tobacco: Never Used    Tobacco comment: On and off smoker for 20 years   Substance Use Topics    Alcohol use: Yes     Alcohol/week: 0.0 standard drinks                                Counseling given: Not Answered  Comment: On and off smoker for 20 years      Vital Signs (Current):   Vitals:    08/11/21 0601 08/11/21 0608 08/11/21 0645   BP: (!) 142/100  (!) 139/90   Pulse: 101  84   Resp: 16     Temp: 96.4 °F (35.8 °C)     TempSrc: Temporal     SpO2: 98%     Weight:  200 lb (90.7 kg)    Height:  6' (1.829 m)                                               BP Readings from Last 3 Encounters:   08/11/21 (!) 139/90   08/03/21 (!) 134/92   11/11/15 130/74       NPO Status: Time of last liquid consumption: 2330                        Time of last solid consumption: 2230                        Date of last liquid consumption: 08/10/21                        Date of last solid food consumption: 08/10/21    BMI:   Wt Readings from Last 3 Encounters:   08/11/21 200 lb (90.7 kg)   08/06/21 189 lb (85.7 kg)   08/03/21 198 lb (89.8 kg)     Body mass index is 27.12 kg/m².     CBC:   Lab Results   Component Value Date    WBC 6.8 12/13/2013    RBC 4.86 12/13/2013    HGB 15.1 12/13/2013    HCT 44.3 12/13/2013    MCV 91.0 12/13/2013    RDW 12.4 12/13/2013     12/13/2013       CMP:   Lab Results   Component Value Date     12/13/2013    K 5.2 12/13/2013     12/13/2013    CO2 29 12/13/2013    BUN 15 12/13/2013    CREATININE 1.10 12/13/2013    GFRAA >60 12/13/2013    LABGLOM >60 12/13/2013    GLUCOSE 101 12/13/2013    GLUCOSE 90 04/07/2012    PROT 7.8 12/13/2013    CALCIUM 9.5 12/13/2013    BILITOT 0.67 12/13/2013    ALKPHOS 57 12/13/2013    AST 25 12/13/2013    ALT 21 12/13/2013       POC Tests: No results for input(s): POCGLU, POCNA, POCK, POCCL, POCBUN, POCHEMO, POCHCT in the last 72 hours. Coags: No results found for: PROTIME, INR, APTT    HCG (If Applicable): No results found for: PREGTESTUR, PREGSERUM, HCG, HCGQUANT     ABGs: No results found for: PHART, PO2ART, VDX5NPB, QRT9RVT, BEART, H4ESVUFJ     Type & Screen (If Applicable):  No results found for: LABABO, LABRH    Drug/Infectious Status (If Applicable):  No results found for: HIV, HEPCAB    COVID-19 Screening (If Applicable):   Lab Results   Component Value Date    COVID19 Not Detected 08/07/2021           Anesthesia Evaluation    Airway: Mallampati: I  TM distance: >3 FB   Neck ROM: full  Mouth opening: > = 3 FB Dental:          Pulmonary:Negative Pulmonary ROS                              Cardiovascular:Negative CV ROS                      Neuro/Psych:               GI/Hepatic/Renal:             Endo/Other:                     Abdominal:             Vascular: Other Findings:             Anesthesia Plan      general     ASA 1             Anesthetic plan and risks discussed with patient.                       Nati Sanders MD   8/11/2021

## 2021-08-11 NOTE — OP NOTE
[place] [address]  Dept: [phone]: [phone]      Orthopedic Surgery Operative Report      Patient: Berny Baltazar      MRN#: [id number]     YOB: 1965      Date of Admission: 8/11/2021    Attending Surgeon: Cody Interiano M.D.   PCP: No primary care provider on file. Preoperative Diagnosis:   1. Left ankle fracture (Maisonneuve injury with a displaced medial malleolus fracture, comminuted posterior malleolus fracture, proximal fibula fracture)  2. Left ankle syndesmotic disruption  3. Tobacco use  4. Body mass index is 27.12 kg/m². Postoperative Diagnosis:   1. Same as above    Procedures Performed:  (8/11/2021)  1. Left ankle medial malleolus open reduction internal fixation  2. Left ankle syndesmosis open reduction internal fixation      Implants:    [place] 4.0 mm cannulated screws x2, 3 hole plate with Arthrex tight rope x2  Implant Name Type Inv. Item Serial No.  Lot No. LRB No. Used Action   SYSTEM IMPL TI UHMWPE KNOTLESS FOR SYNDESMOSIS FIX  SYSTEM IMPL TI UHMWPE KNOTLESS FOR SYNDESMOSIS FIX  [employer] [id number] Left 2 Implanted   PLATE BNE [id number] 3 H NONSTERILE 1 3RD TBLR  PLATE BNE [id number] 3 H NONSTERILE 1 3RD TBLR  DIANDRA REJI[employer]WD  Left 1 Implanted   SCREW TIMOTHY PTHRD ASNIS III [id number]  SCREW TIMOTHY PTHRD ASNIS III [id number]  [employer] [facility]  Left 2 Implanted         Attending Surgeon:     Lane Li MD      Assistant Surgeon:    Resident: Gayle Elizabeth DO      Anesthesia:    General      Staff:  Surgeon(s):  Boaz Fishman MD  Scrub Person First: Sidney Correa  Anesthesia: Conner Snow MD      Estimated Blood Loss:    Minimal  * No values recorded between 8/11/2021  7:26 AM and 8/11/2021  8:53 AM * mL      Complications:    None      Specimen:    * No specimens in log *      History:    Mr. Berny Baltazar is a 49 y.o. male who was seen recently for the above problem.      He has a left ankle fracture (Maisonneuve type injury with displaced medial malleolus fracture, comminuted posterior malleolus avulsion fracture, and proximal fibula fracture with syndesmotic disruption), sustained on 8/3/2021.      Notably, he has the past medical history as above. He has a history of tobacco use (he reports that he vapes). He is currently medically stable and appropriate for the planned procedure. We have spoken about the risks/benefits/alternatives to a surgical intervention, verbal understanding of these risks was expressed, and informed consent was obtained. All questions were answered. No guarantees were made or implied. I have answered all questions, and they wish to proceed with surgical intervention. Operative Note:    The patient was identified in the preoperative holding area by name, MRN, and . The surgical site was verified and marked according to AAOS guidelines. The patient was taken to the operating room and placed on the operating table in a supine position. After achieving adequate sedation by the anesthesia team, the patient was then carefully positioned with all bony prominences well padded. A tourniquet was placed on the ipsilateral thigh, and then the operative extremity was prepped and draped in the usual sterile fashion. A timeout was held in which the patient's identity, surgical site, procedure, allergies, and antibiotics were verified, and all in the room were in agreement, and thus the procedure began. The leg was exsanguinated to the level of the tourniquet. The tourniquet was elevated to 275 mm Hg, and the total tourniquet time was 60 minutes. Attention was turned to the medial malleolus where an incision was marked out from just distal to the tip of the medial malleolus to several cm proximal. The skin was incised sharply and vessels were cauterized. The Saphenous vein and nerve were safely retracted out of the field.  Careful dissection was carried down to the fracture site, which was identified by elevating periosteum at the edges of the fracture site. Gentle distraction was then placed across the fracture site to facilitate cleaning out the organizing hematoma, using a suction tip and a pair of forceps, to remove any impediments to reduction. The fracture was then reduced with a dental pick and digital pressure, and held into place provisionally with a K-wire. An anatomic cortical read was directly visualized. There was some comminution noted. A combination of fluoroscopy (AP, mortise, and lateral views) and direct visualization were used to ensure that length, alignment, and rotation were restored. A second K-wire was then placed across the fracture site. AP and lateral fluoroscopy was then obtained to confirm the anatomic fracture reduction, as well as the positioning of the wires, which were guide-wires for cannulated screws. The depths were measured and the outer cortices were drilled using the cannulated drill, using a drill sleeve to protect the soft tissues. 4.0 mm partially threaded cannulated screws were then chosen and inserted, ensuring the maintenance of the reduction as well as appropriate fixation. The posterior tibial tendon was not entrapped by either screw. Final fluoroscopy shots were taken, including an AP, Mortise, and Lateral. The reduction was deemed to be appropriate and the hardware was in appropriate and safe position. The ankle joint moved smoothly without crepitus. The fixation was stable. Attention was turned to the syndesmosis. An Arthrex tight rope was placed parallel to the joint, using a guide wire, followed by a 3.7 mm cannulated drill to drill four cortices, from the fibula to the tibia, aiming anterior with the trajectory, at about the level of the syndesmosis with the ankle and foot position being held at neutral. The tight rope was then placed, and the button was flipped and seated appropriately.  The tight rope was secured down and tightened, and stably fixed. The loose sutures of the tightrope were trimmed flush with the plate. The syndesmosis was noted to be stable on subsequent stress xrays and the closed down along with the medial clear space. Copious sterile irrigation was used to rinse the operative sites, and a layered closure was performed using 2-0 vicryl and 3-0 nylon, providing appropriate tension to the skin. The skin was cleaned and gently dried. Steri-Strips were then applied, and anti-bacterial ointment was applied on top of that, with Adaptic and fluffs. A sterile layer of cast padding was then applied. A short leg splint was then applied with the ankle at neutral.     The patient was aroused from anesthesia without complication, and gently transferred to the bed and then transported to the postoperative area in a stable condition. The patient was noted to have tolerated the procedure well without complication. Postoperative Plan:         Precautions:  nonweight bearing x 10 weeks anticipated on the Left lower extremity   -             []  Physical Therapy/Home exercises       Immobilization:      [x]  Splint/Cast  []  CAM boot  []  Comfortable shoe    []  Other:      DVT ppx:   [x]  Early mobilization       [x]  Medication as prescribed (Lovenox)      []  No chemical ppx needed; mechanical only   -    Pain control:  Medication as prescribed (dosing and quantity) indicated for acute postoperative pain control   -    Special concerns:          [x]  Tobacco cessation         [x]  Avoid strenuous activity/pain provoking maneuvers and high-impact repetitive exercises    -    Disposition:   PACU      The patient has been instructed to follow up in the office. The particulars of surgery as well as the condition of the patient postoperatively were discussed with the patients family thereafter per the patient's wishes.            Noelle Merritt MD  Orthopedic Surgery

## 2021-08-11 NOTE — H&P
Interval H&P Note    Pt Name: Arlene Cerrato  MRN: [id number]  YOB: 1965  Date of evaluation: 8/11/2021      [x] I have reviewed in epic and attached below the Orthopedic Progress Note by Dr Adelaida Brock dated 8/6/21 for an Interval History and Physical note. [x] I have examined  Arlene Cerrato  There are no changes to the patient who is scheduled for a left ankle ORIF syndesmosis by Dr Rg Cardozo for  Left ankle fracture. The patient denies new health changes, fever, chills, wheezing, cough, increased SOB, chest pain, open sores or wounds. PMH, Surgical History, Social History, Psych, and Family History reviewed and updated in EPIC in appropriate section. Vital signs: BP (!) 142/100   Pulse 101   Temp 96.4 °F (35.8 °C) (Temporal)   Resp 16   SpO2 98%     Allergies:  Patient has no known allergies. Medications:    Prior to Admission medications    Medication Sig Start Date End Date Taking? Authorizing Provider   HYDROcodone-acetaminophen (NORCO) 5-325 MG per tablet Take 1 tablet by mouth every 6 hours as needed for Pain for up to 7 days. Do not exceed 3000 mg of Acetaminophen in 24 hrs. 8/6/21 8/13/21  Keaton Lowery MD   enoxaparin (LOVENOX) 40 MG/0.4ML injection Inject 0.4 mLs into the skin daily 8/6/21   Keaton Lowery MD   HYDROcodone-acetaminophen (NORCO) 5-325 MG per tablet Take 1 tablet by mouth every 6 hours as needed for Pain for up to 7 days. Do not exceed 3000 mg of Acetaminophen in 24 hrs. 8/6/21 8/13/21  Keaton Lowery MD         This is a 49 y.o. male who is pleasant, cooperative, alert and oriented x3, in no acute distress. Heart: /100 asymptomatic anxious  Heart sounds are normal.  Apical HR 88 regular rate and rhythm without murmur, gallop or rub.    Lungs: Normal respiratory effort with equal expansion, good air exchange, unlabored and clear to auscultation without wheezes or rales bilaterally   Abdomen: soft, nontender, nondistended with bowel sounds .  Extremities: left lower leg casted with first 3 toes exposed Normal sensation and movement with good capillary refill        Labs:  No results for input(s): HGB, HCT, WBC, MCV, PLT, NA, K, CL, CO2, BUN, CREATININE, GLUCOSE, INR, PROTIME, APTT, AST, ALT, LABALBU, HCG in the last 720 hours. Recent Labs     08/07/21  1200   COVID19       Not Detected       RANDY Crawley CNP   Electronically signed 8/11/2021 at 6:06 AM      Jacob Davila MD   Physician   Specialty:  Orthopedic Surgery   Progress Notes       Addendum   Encounter Date:  8/6/2021         Related encounter: Office Visit from 8/6/2021 in [place] [address]  Dept: [phone]     Ambulatory Orthopedic Consult        CHIEF COMPLAINT:         Chief Complaint   Patient presents with    Ankle Pain       left         HISTORY OF PRESENT ILLNESS:       The patient is a 49 y.o. male who is being seen for evaluation of the above, which began 8/3/2021 secondary to a twisting injury after getting knocked over by his dogs  . At today's visit, he is using a splint/cast.      History is obtained today from:   [x]?  the patient     [x]? EMR     [x]?  one family member/friend --patient's wife   []?  multiple family members/friends    []? other:             REVIEW OF SYSTEMS:  Constitutional: Negative for fever. HENT: Negative for tinnitus. Eyes: Negative for pain. Respiratory: Negative for shortness of breath. Cardiovascular: Negative for chest pain. Gastrointestinal: Negative for abdominal pain. Genitourinary: Negative for dysuria. Skin: Negative for rash. Neurological: Negative for headaches. Hematological: Does not bruise/bleed easily.    Musculoskeletal: See HPI for pertinent positives     Past Medical History:    He   Past Medical History in prose (no negatives)    has a past medical history of Anxiety. Past Surgical History:    He  has a past surgical history that includes hernia repair. Current Medications:     Current Medication      Current Outpatient Medications:     HYDROcodone-acetaminophen (NORCO) 5-325 MG per tablet, Take 1 tablet by mouth every 6 hours as needed for Pain for up to 7 days. Do not exceed 3000 mg of Acetaminophen in 24 hrs., Disp: 10 tablet, Rfl: 0    enoxaparin (LOVENOX) 40 MG/0.4ML injection, Inject 0.4 mLs into the skin daily, Disp: 42 Syringe, Rfl: 0    HYDROcodone-acetaminophen (NORCO) 5-325 MG per tablet, Take 1 tablet by mouth every 6 hours as needed for Pain for up to 7 days. Do not exceed 3000 mg of Acetaminophen in 24 hrs., Disp: 10 tablet, Rfl: 0         Allergies:    Patient has no known allergies. Family History:  family history includes Other in his father. Social History:   Social History            Occupational History    Not on file   Tobacco Use    Smoking status: Never Smoker    Smokeless tobacco: Never Used   Substance and Sexual Activity    Alcohol use: Yes       Alcohol/week: 0.0 standard drinks    Drug use: No    Sexual activity: Not on file      Occupation: Full-time      OBJECTIVE:  Resp 16   Ht 6' (1.829 m)   Wt 189 lb (85.7 kg)   BMI 25.63 kg/m²    Psych: alert and oriented to person, time, and place   Cardio:  well perfused extremities, no cyanosis   Resp:  normal respiratory effort  Musculoskeletal:    Affected lower extremity:    Vascular: Limb well perfused, compartments soft/compressible. Skin: No erythema/ulcers. Intact.    Neurovascular Status:  Grossly neurovascularly intact throughout  Motion:  Grossly able to fire major muscle groups with appropriate/expected AROM  Tenderness to Palpation: Diffusely about the ankle and proximal fibula  -Positive squeeze test for syndesmotic injury        RADIOLOGY:   8/6/2021 FINDINGS:  Three views (AP, Mortise, and Lateral) of the left ankle and three views (AP, Oblique, Lateral) of the left foot were obtained in the office today and reviewed, revealing displaced medial malleolus fracture, and posterior malleolus avulsion fracture with comminution. Healed fracture deformity of the third metatarsal.     IMPRESSION:  Osseous injury as above. Electronically signed by Denise Teague MD        Relevant previous imaging reviewed, both imaging and report(s) as below:    XR TIBIA FIBULA LEFT (2 VIEWS)     Result Date: 8/3/2021  Left tib fib: 1. Acute obliquely oriented proximal left fibular diaphyseal fracture. 2. Slightly displaced medial malleolus fracture with medial and anterior ankle soft tissue swelling. 3. Mild soft tissue edema of the left leg. Left ankle: 1. Acute transversely oriented medial malleolus fracture with medial and anterior ankle soft tissue swelling. 2. Soft tissue swelling at the anterior left lower leg. Mild edema of the left leg. XR ANKLE LEFT (MIN 3 VIEWS)     Result Date: 8/3/2021  Left tib fib: 1. Acute obliquely oriented proximal left fibular diaphyseal fracture. 2. Slightly displaced medial malleolus fracture with medial and anterior ankle soft tissue swelling. 3. Mild soft tissue edema of the left leg. Left ankle: 1. Acute transversely oriented medial malleolus fracture with medial and anterior ankle soft tissue swelling. 2. Soft tissue swelling at the anterior left lower leg. Mild edema of the left leg. ASSESSMENT AND PLAN:  Body mass index is 25.63 kg/m². He has a left ankle fracture (Maisonneuve type injury with displaced medial malleolus fracture, comminuted posterior malleolus avulsion fracture, and proximal fibula fracture with syndesmotic disruption), sustained on 8/3/2021. Notably, he has the past medical history as above. He has a history of tobacco use (he reports that he vapes).      We had a discussion today about the likely diagnosis and its natural history, physical exam and imaging findings, as well as various treatment options in detail. Surgically, we discussed a left ankle ORIF with syndesmotic fixation. Given the displacement and instability of this lesion, I did recommend surgery to help provide him a stable ankle decrease his risks of arthritis, but we did discuss the risks of arthritis even with surgical treatment. We discussed the expected postoperative course, including the relevant weightbearing restrictions and immobilization. We also discussed the patient's ability to return to work, as well as an estimate of the anticipated timeframe to return to work, in the context of their specific job functions/level of activity. No guarantees were made. We did discuss his tobacco use in the context of surgery, and I did recommend tobacco cessation. We did discuss that his risks of postoperative complications and DVT/pulmonary embolus are higher given his tobacco use. Orders/referrals were placed as below at today's visit. The patient will avoid pain provoking activity. He was placed into a Reinoso splint, and counseled on swelling control. In order to know exactly how to proceed surgically, a CT was ordered today for preoperative planning. This is medically necessary to evaluate the exact bony alignment/architecture. At today's visit, the patient was ordered DME as below. I also ordered physical therapy for the patient to hep reinforce/teach the relevant weight bearing precautions, to help allow for safe transfers and early mobilization, as well as effectively utilize DME. Surgical booking paperwork was completed and submitted. We spoke about the risks/benefits/alternatives to a surgical intervention.  They understand that the risks of surgery may include but are not limited to pain, infection, bleeding, blood clot, damage to soft tissue/vessel/nerve, future surgery, scarring/stiffness, decreased strength/weakness, cosmetic deformity, neuroma/neuritis/phantom pains, delayed soft tissue/bone healing, nonunion, malunion, failure of hardware/fixation/surgery, iatrogenic fracture/dislocation, damage to bone/joint(s), worsening of condition, recurrence, limb length discrepancy, avascular necrosis of bone, neurovascular compromise/compartment syndrome, tourniquet complications, failure of surgery, dissatisfaction with outcome, loss of limb, stroke, heart attack, pulmonary embolus, mental status change, anesthesia risks/reaction, and even death. They expressed verbal understanding of the risks and wish to proceed with surgical intervention. All questions were answered. No guarantees were made or implied. Informed consent was obtained. We also had a discussion about the risk of blood clot and thromboembolic events. The patient understands that there is an increased risk with surgery/immobilization, and understands nothing will completely eliminate the risk of DVT/PE's, and that any prophylactic medication does not substitute for early mobilization. Given his risk profile, I have recommended the following strategy to decrease the risk of blood clots, and the patient agrees and wishes to proceed:  Lovenox 40 mg subQ q Day. --Lovenox was ordered for him at today's visit, and he will begin taking this right away, and we did discuss this, particularly again in the context of his tobacco use. -He was also ordered 10 tabs of hydrocodone for acute pain control. All questions were answered and the above plan was agreed upon. The patient will return to clinic postoperatively . At the patient's next visit, depending on how the patient is doing and/or new imaging/labs results, we may consider the following options:    []? Lace up ankle     []? CAM boot         []?  removable wrist brace     []? PT:        []? Wean out immobilization         []? Adv activity       []? Footmind        []? Spenco       []?   Custom Orthotic:               []?  [place] brace                    []?  Rocker Bottom       []? Night splint    []? Heel cups        []? Strap        []? Toe gizmos    []? Topl        []? NSAIDs         []? Rodolfo        []? Ref:         []? Stress Xray    []?  CT        []? MRI          []? Inj:           []? Consider OR      []? Pick OR date     No follow-ups on file. Encounter Medications    No orders of the defined types were placed in this encounter. No orders of the defined types were placed in this encounter. Misty Villatoro MD  Orthopedic Surgery           Please excuse any typos/errors, as this note was created with the assistance of voice recognition software. While intending to generate a document that actually reflects the content of the visit, the document can still have some errors including those of syntax and sound-a-like substitutions which may escape proof reading. In such instances, actual meaning can be extrapolated by context.       Revision History

## 2021-08-12 ENCOUNTER — CLINICAL DOCUMENTATION (OUTPATIENT)
Dept: ORTHOPEDIC SURGERY | Age: 56
End: 2021-08-12

## 2021-08-12 NOTE — PROGRESS NOTES
I called the patient at home for a routine check to discuss how he was doing, as well as reinforce the relevant postoperative restrictions/precautions, discuss medications, and answer any further questions. I was unable to reach him, but did leave a voice message and encouraged him to reach out with any concerns.

## 2021-08-17 ENCOUNTER — TELEPHONE (OUTPATIENT)
Dept: ORTHOPEDIC SURGERY | Age: 56
End: 2021-08-17

## 2021-08-17 NOTE — TELEPHONE ENCOUNTER
Pt post op dos 8/11/2021 -- pt has splint on - took a shower with bag covering the extremity - but bagged leaked - needs splint replace or fixed - please call pt on what to do [phone]

## 2021-08-26 ENCOUNTER — OFFICE VISIT (OUTPATIENT)
Dept: ORTHOPEDIC SURGERY | Age: 56
End: 2021-08-26

## 2021-08-26 VITALS — HEIGHT: 72 IN | WEIGHT: 200 LBS | BODY MASS INDEX: 27.09 KG/M2 | RESPIRATION RATE: 15 BRPM | HEART RATE: 72 BPM

## 2021-08-26 DIAGNOSIS — S82.862D CLOSED DISPLACED MAISONNEUVE FRACTURE OF LEFT LOWER EXTREMITY WITH ROUTINE HEALING, SUBSEQUENT ENCOUNTER: Primary | ICD-10-CM

## 2021-08-26 PROCEDURE — 99024 POSTOP FOLLOW-UP VISIT: CPT | Performed by: ORTHOPAEDIC SURGERY

## 2021-08-26 NOTE — PROGRESS NOTES
[facility]  [place] [address]  Dept: [phone]    Ambulatory Orthopedic Postoperative Visit     Preoperative Diagnosis:   1. Left ankle fracture (Maisonneuve injury with a displaced medial malleolus fracture, comminuted posterior malleolus fracture, proximal fibula fracture)  2. Left ankle syndesmotic disruption  3. Tobacco use  4. Body mass index is 27.12 kg/m².     Postoperative Diagnosis:   1. Same as above     Procedures Performed:  (8/11/2021)  1. Left ankle medial malleolus open reduction internal fixation  2. Left ankle syndesmosis open reduction internal fixation         SUBJECTIVE:     The patient returns post op from the above stated procedure. Reports doing well overall, reports improved pain, denies wound drainage/issues, fevers/chills/night sweats, calf swelling/pain, chest pain, shortness of breath. OBJECTIVE:  Pulse 72   Resp 15   Ht 6' (1.829 m)   Wt 200 lb (90.7 kg)   BMI 27.12 kg/m²    NAD, resting comfortably  Incisions clean/dry/intact, no erythema/dehiscence/drainage  Sensation to light touch grossly intact throughout  Warm and well perfused  Grossly neurovascularly intact distally  No signs of infection  No calf swelling/tenderness      RADIOLOGY:   8/26/2021 No new radiology images today. Prior images reviewed for reference. ASSESSMENT AND PLAN:     2 weeks s/p above, doing well overall      He has a left ankle fracture (Maisonneuve type injury with displaced medial malleolus fracture, comminuted posterior malleolus avulsion fracture, and proximal fibula fracture with syndesmotic disruption), sustained on 8/3/2021.      Notably, he has the past medical history as above.  He has a history of tobacco use (he reports that he vapes).       [x]  Sutures/staples were removed and steri-strips applied          Precautions:  nonweight bearing x 10 weeks anticipated on the Left lower

## 2021-09-01 DIAGNOSIS — S82.862D CLOSED DISPLACED MAISONNEUVE FRACTURE OF LEFT LOWER EXTREMITY WITH ROUTINE HEALING, SUBSEQUENT ENCOUNTER: ICD-10-CM

## 2021-09-01 DIAGNOSIS — S82.842A CLOSED BIMALLEOLAR FRACTURE OF LEFT ANKLE, INITIAL ENCOUNTER: Primary | ICD-10-CM

## 2021-09-01 NOTE — TELEPHONE ENCOUNTER
Pt wife called stating  in increased pain. Asking for another Rx of pain medication for him. Pt wife also states that they live in [place] and will be dropping off a form for handicap placard to be signed by Dr Ilir Carmona.

## 2021-09-02 DIAGNOSIS — S82.899D CLOSED FRACTURE OF ANKLE WITH ROUTINE HEALING, UNSPECIFIED LATERALITY, SUBSEQUENT ENCOUNTER: Primary | ICD-10-CM

## 2021-09-02 RX ORDER — HYDROCODONE BITARTRATE AND ACETAMINOPHEN 5; 325 MG/1; MG/1
1 TABLET ORAL EVERY 6 HOURS PRN
Qty: 10 TABLET | Refills: 0 | Status: SHIPPED | OUTPATIENT
Start: 2021-09-02 | End: 2021-09-09

## 2021-09-02 RX ORDER — OXYCODONE HYDROCHLORIDE AND ACETAMINOPHEN 5; 325 MG/1; MG/1
1 TABLET ORAL EVERY 6 HOURS PRN
Qty: 20 TABLET | Refills: 0 | OUTPATIENT
Start: 2021-09-02 | End: 2021-09-09

## 2021-09-21 ENCOUNTER — TELEPHONE (OUTPATIENT)
Dept: ORTHOPEDIC SURGERY | Age: 56
End: 2021-09-21

## 2021-09-21 NOTE — TELEPHONE ENCOUNTER
Wondering if his papers have been finished and signed. He has an appt on Thursday.   He would like to pick and or get a copy

## 2021-09-22 DIAGNOSIS — S82.899D CLOSED FRACTURE OF ANKLE WITH ROUTINE HEALING, UNSPECIFIED LATERALITY, SUBSEQUENT ENCOUNTER: Primary | ICD-10-CM

## 2021-09-23 ENCOUNTER — OFFICE VISIT (OUTPATIENT)
Dept: ORTHOPEDIC SURGERY | Age: 56
End: 2021-09-23

## 2021-09-23 VITALS — BODY MASS INDEX: 27.09 KG/M2 | HEIGHT: 72 IN | WEIGHT: 200 LBS | RESPIRATION RATE: 14 BRPM

## 2021-09-23 DIAGNOSIS — S82.899D CLOSED FRACTURE OF ANKLE WITH ROUTINE HEALING, UNSPECIFIED LATERALITY, SUBSEQUENT ENCOUNTER: Primary | ICD-10-CM

## 2021-09-23 DIAGNOSIS — Z91.199 NONCOMPLIANCE: ICD-10-CM

## 2021-09-23 DIAGNOSIS — S82.862D CLOSED DISPLACED MAISONNEUVE FRACTURE OF LEFT LOWER EXTREMITY WITH ROUTINE HEALING, SUBSEQUENT ENCOUNTER: Primary | ICD-10-CM

## 2021-09-23 PROCEDURE — 99024 POSTOP FOLLOW-UP VISIT: CPT | Performed by: ORTHOPAEDIC SURGERY

## 2021-09-23 RX ORDER — ASPIRIN 325 MG
325 TABLET, DELAYED RELEASE (ENTERIC COATED) ORAL DAILY
Qty: 42 TABLET | Refills: 0 | Status: SHIPPED | OUTPATIENT
Start: 2021-09-23 | End: 2021-11-04

## 2021-09-23 NOTE — PROGRESS NOTES
[facility]  [address] [address]  Dept: [phone]    Ambulatory Orthopedic Postoperative Visit     Preoperative Diagnosis:   1. Left ankle fracture (Maisonneuve injury with a displaced medial malleolus fracture, comminuted posterior malleolus fracture, proximal fibula fracture)  2. Left ankle syndesmotic disruption  3. Tobacco use  4. Body mass index is 27.12 kg/m².     Postoperative Diagnosis:   1. Same as above     Procedures Performed:  (8/11/2021)  1. Left ankle medial malleolus open reduction internal fixation  2. Left ankle syndesmosis open reduction internal fixation         SUBJECTIVE:     The patient returns post op from the above stated procedure. Reports doing well overall, reports improved pain, denies wound drainage/issues, fevers/chills/night sweats, calf swelling/pain, chest pain, shortness of breath. OBJECTIVE:  Resp 14   Ht 6' (1.829 m)   Wt 200 lb (90.7 kg)   BMI 27.12 kg/m²    NAD, resting comfortably  Incisions clean/dry/intact, no erythema/dehiscence/drainage  Sensation to light touch grossly intact throughout  Warm and well perfused  Grossly neurovascularly intact distally  No signs of infection  No calf swelling/tenderness  -Painless ankle range of motion, ankle stiffness present      RADIOLOGY:   9/23/2021 FINDINGS:  Three simulated weightbearing views (AP, Mortise, and Lateral) of the left ankle were obtained in the office today and reviewed, revealing intact hardware status post ORIF of medial malleolus and syndesmosis. Also noted:  interval healing, no interval displacement and hardware intact without evidence of loosening. Overall alignment is satisfactory. IMPRESSION:  Ankle fracture as above s/p ORIF.      Electronically signed by Anne Matthews MD       ASSESSMENT AND PLAN:     6 weeks s/p above, doing well overall      He has a left ankle fracture (Maisonneuve type injury with displaced medial malleolus fracture, comminuted posterior malleolus avulsion fracture, and proximal fibula fracture with syndesmotic disruption), sustained on 8/3/2021.      Notably, he has the past medical history as above.  He has a history of tobacco use (he reports that he vapes). Precautions:  nonweight bearing x 10 weeks anticipated on the Left lower extremity             -We will advance the patient's weightbearing status on 10/20/2021, and the importance of strictly adhering to the recommendations was highlighted. The patient will begin weightbearing at 50% partial weightbearing for 2 weeks, progress to 75% partial weightbearing for 2 weeks, then progress to weightbearing as tolerated for 2 weeks. Weightbearing will only be performed while protected in the CAM boot. An educational handout was also provided explaining/reinforcing these details. [x]? Physical Therapy/Home exercises    --begin range of motion now     Immobilization:      []? Splint/Cast               [x]? CAM boot                    []?  Comfortable shoe    []? Other:                DVT ppx:   [x]? Early mobilization              [x]? Medication as prescribed (Lovenox previously)                --6 weeks of aspirin 325 mg p.o. daily ordered; he does report that he stopped taking the Lovenox several weeks ago; we did again discussed the importance of DVT prophylaxis and the risk of pulmonary embolus and death, and he expressed verbal understanding    []? No chemical ppx needed; mechanical only             -     Special concerns:          [x]? Tobacco cessation           [x]? Avoid strenuous activity/pain provoking maneuvers and high-impact repetitive exercises       All questions were answered and the patient agrees with the above plan. The patient will return to clinic in 6 weeks with repeat left ankle x-rays, semiweightbearing         No follow-ups on file.     No orders of the defined types were placed in this encounter. No orders of the defined types were placed in this encounter. Pauline Sebastian MD  Orthopedic Surgery        Please excuse any typos/errors, as this note was created with the assistance of voice recognition software. While intending to generate a document that actually reflects the content of the visit, the document can still have some errors including those of syntax and sound-a-like substitutions which may escape proof reading. In such instances, actual meaning can be extrapolated by context.

## 2021-11-03 DIAGNOSIS — S82.899D CLOSED FRACTURE OF ANKLE WITH ROUTINE HEALING, UNSPECIFIED LATERALITY, SUBSEQUENT ENCOUNTER: Primary | ICD-10-CM

## 2021-11-04 ENCOUNTER — OFFICE VISIT (OUTPATIENT)
Dept: ORTHOPEDIC SURGERY | Age: 56
End: 2021-11-04

## 2021-11-04 VITALS — RESPIRATION RATE: 12 BRPM | BODY MASS INDEX: 27.09 KG/M2 | WEIGHT: 200 LBS | HEIGHT: 72 IN

## 2021-11-04 DIAGNOSIS — S82.899D CLOSED FRACTURE OF ANKLE WITH ROUTINE HEALING, UNSPECIFIED LATERALITY, SUBSEQUENT ENCOUNTER: Primary | ICD-10-CM

## 2021-11-04 PROCEDURE — 99024 POSTOP FOLLOW-UP VISIT: CPT | Performed by: ORTHOPAEDIC SURGERY

## 2021-11-04 NOTE — LETTER
[address] [place] [address] and [facility]  [place] [address]  Phone: [phone]  Fax: [phone]    Nicholas Bernabe MD        November 4, 2021     Patient: Sirisha Charles   YOB: 1965   Date of Visit: 11/4/2021       To Whom It May Concern: It is my medical opinion that Ysabel Farrell may return to work on 12/2/2021 full duty with no restrictions. If you have any questions or concerns, please don't hesitate to call.     Sincerely,    The office of Dr. Winter Rojas MD

## 2021-11-04 NOTE — PROGRESS NOTES
[facility]  [address] [address]  Dept: [phone]    Ambulatory Orthopedic Postoperative Visit     Preoperative Diagnosis:   1. Left ankle fracture (Maisonneuve injury with a displaced medial malleolus fracture, comminuted posterior malleolus fracture, proximal fibula fracture)  2. Left ankle syndesmotic disruption  3. Tobacco use  4. Body mass index is 27.12 kg/m².     Postoperative Diagnosis:   1. Same as above     Procedures Performed:  (8/11/2021)  1. Left ankle medial malleolus open reduction internal fixation  2. Left ankle syndesmosis open reduction internal fixation       SUBJECTIVE:     The patient returns post op from the above stated procedure. Reports doing well overall, reports improved pain, denies wound drainage/issues, fevers/chills/night sweats, calf swelling/pain, chest pain, shortness of breath. OBJECTIVE:  Resp 12   Ht 6' (1.829 m)   Wt 200 lb (90.7 kg)   BMI 27.12 kg/m²    NAD, resting comfortably  Incisions clean/dry/intact, no erythema/dehiscence/drainage  Sensation to light touch grossly intact throughout  Warm and well perfused  Grossly neurovascularly intact distally  No signs of infection  No calf swelling/tenderness  -Painless ankle range of motion--range of motion improved  -No tenderness to palpation      RADIOLOGY:   11/4/2021 FINDINGS:  Three simulated weightbearing views (AP, Mortise, and Lateral) of the left ankle were obtained in the office today and reviewed, revealing intact hardware status post ORIF of medial malleolus and syndesmosis. Also noted:  interval healing, no interval displacement and hardware intact without evidence of loosening. Overall alignment is satisfactory. IMPRESSION:  Ankle fracture as above s/p ORIF. Electronically signed by Eleazar Mehta MD       ASSESSMENT AND PLAN:     12 weeks s/p above, doing well overall.  His postoperative course has been complicated by not adhering to the recommended DVT prophylaxis. He has a left ankle fracture (Maisonneuve type injury with displaced medial malleolus fracture, comminuted posterior malleolus avulsion fracture, and proximal fibula fracture with syndesmotic disruption), sustained on 8/3/2021.      Notably, he has the past medical history as above.  He has a history of tobacco use (he reports that he vapes). Precautions:  nonweight bearing x 10 weeks anticipated on the Left lower extremity             -Continue weightbearing progression, then wean out of the boot into regular shoe               [x]? Physical Therapy/Home exercises         Immobilization:      []? Splint/Cast               [x]? CAM boot                    [x]? Comfortable shoe    []? Other:                DVT ppx:   [x]? Early mobilization              []?  Medication as prescribed (Lovenox previously, then aspirin)                   [x]? No chemical ppx needed; mechanical only             -     Special concerns:          [x]? Tobacco cessation           [x]? Avoid strenuous activity/pain provoking maneuvers and high-impact repetitive exercises     -He was provided a note, stated that he is cleared to return to work on 12/2/2021; we discussed that after he is cleared to return to work, he will need to pass a physical exam at work, and be inspected by one of their physicians    All questions were answered and the patient agrees with the above plan. The patient will return to clinic in 3 months with left ankle x-rays         No follow-ups on file. No orders of the defined types were placed in this encounter.     Orders Placed This Encounter   Procedures    DME Order for (Specify) as OP     DME: compression stockings (20-30 mm Hg)  Routine, External, Referral By - Steven Finney, Samy-1, Supply Name: Compression Stockings Prognosis: good Estimated length of need: 99         Madaline Sicard, MD  Orthopedic Surgery        Please excuse any typos/errors, as this note was created with the assistance of voice recognition software. While intending to generate a document that actually reflects the content of the visit, the document can still have some errors including those of syntax and sound-a-like substitutions which may escape proof reading. In such instances, actual meaning can be extrapolated by context.

## 2022-01-26 DIAGNOSIS — S82.899D CLOSED FRACTURE OF ANKLE WITH ROUTINE HEALING, UNSPECIFIED LATERALITY, SUBSEQUENT ENCOUNTER: Primary | ICD-10-CM

## 2024-08-13 ENCOUNTER — HOSPITAL ENCOUNTER (OUTPATIENT)
Age: 59
Discharge: HOME | End: 2024-08-13
Payer: COMMERCIAL

## 2024-08-13 ENCOUNTER — OFFICE VISIT (OUTPATIENT)
Dept: PRIMARY CARE CLINIC | Age: 59
End: 2024-08-13
Payer: COMMERCIAL

## 2024-08-13 VITALS
OXYGEN SATURATION: 97 % | BODY MASS INDEX: 27.63 KG/M2 | HEIGHT: 72 IN | SYSTOLIC BLOOD PRESSURE: 172 MMHG | DIASTOLIC BLOOD PRESSURE: 101 MMHG | WEIGHT: 204 LBS | HEART RATE: 88 BPM

## 2024-08-13 DIAGNOSIS — F41.9 ANXIETY: ICD-10-CM

## 2024-08-13 DIAGNOSIS — Z13.9 DUE FOR SCREENING: ICD-10-CM

## 2024-08-13 DIAGNOSIS — I10 PRIMARY HYPERTENSION: ICD-10-CM

## 2024-08-13 DIAGNOSIS — Z12.11 SCREENING FOR MALIGNANT NEOPLASM OF COLON: ICD-10-CM

## 2024-08-13 DIAGNOSIS — Z76.89 ENCOUNTER TO ESTABLISH CARE: Primary | ICD-10-CM

## 2024-08-13 LAB
ALBUMIN SERPL-MCNC: 4.6 G/DL (ref 3.5–5.2)
ALBUMIN/GLOB SERPL: 1 {RATIO} (ref 1–2.5)
ALP SERPL-CCNC: 68 U/L (ref 40–129)
ALT SERPL-CCNC: 27 U/L (ref 10–50)
ANION GAP SERPL CALCULATED.3IONS-SCNC: 10 MMOL/L (ref 9–16)
AST SERPL-CCNC: 25 U/L (ref 10–50)
BILIRUB SERPL-MCNC: 0.6 MG/DL (ref 0–1.2)
BUN SERPL-MCNC: 13 MG/DL (ref 6–20)
CALCIUM SERPL-MCNC: 9.3 MG/DL (ref 8.6–10.4)
CHLORIDE SERPL-SCNC: 102 MMOL/L (ref 98–107)
CHOLEST SERPL-MCNC: 199 MG/DL (ref 0–199)
CHOLESTEROL/HDL RATIO: 5
CO2 SERPL-SCNC: 25 MMOL/L (ref 20–31)
CREAT SERPL-MCNC: 1.2 MG/DL (ref 0.7–1.2)
ERYTHROCYTE [DISTWIDTH] IN BLOOD BY AUTOMATED COUNT: 11.6 % (ref 11.8–14.4)
EST. AVERAGE GLUCOSE BLD GHB EST-MCNC: 108 MG/DL
GFR, ESTIMATED: 70 ML/MIN/1.73M2
GLUCOSE SERPL-MCNC: 113 MG/DL (ref 74–99)
HBA1C MFR BLD: 5.4 % (ref 4–6)
HCT VFR BLD AUTO: 49.3 % (ref 40.7–50.3)
HCV AB SERPL QL IA: NONREACTIVE
HDLC SERPL-MCNC: 41 MG/DL
HGB BLD-MCNC: 16 G/DL (ref 13–17)
HIV 1+2 AB+HIV1 P24 AG SERPL QL IA: NONREACTIVE
LDLC SERPL CALC-MCNC: 123 MG/DL (ref 0–100)
MCH RBC QN AUTO: 30.1 PG (ref 25.2–33.5)
MCHC RBC AUTO-ENTMCNC: 32.5 G/DL (ref 28.4–34.8)
MCV RBC AUTO: 92.8 FL (ref 82.6–102.9)
NRBC BLD-RTO: 0 PER 100 WBC
PLATELET # BLD AUTO: 336 K/UL (ref 138–453)
PMV BLD AUTO: 9.6 FL (ref 8.1–13.5)
POTASSIUM SERPL-SCNC: 4.4 MMOL/L (ref 3.7–5.3)
PROT SERPL-MCNC: 8 G/DL (ref 6.6–8.7)
RBC # BLD AUTO: 5.31 M/UL (ref 4.21–5.77)
SODIUM SERPL-SCNC: 137 MMOL/L (ref 136–145)
TRIGL SERPL-MCNC: 175 MG/DL
TSH SERPL DL<=0.05 MIU/L-ACNC: 0.96 UIU/ML (ref 0.27–4.2)
VLDLC SERPL CALC-MCNC: 35 MG/DL
WBC OTHER # BLD: 6.5 K/UL (ref 3.5–11.3)

## 2024-08-13 PROCEDURE — 80053 COMPREHEN METABOLIC PANEL: CPT

## 2024-08-13 PROCEDURE — 80061 LIPID PANEL: CPT

## 2024-08-13 PROCEDURE — 86803 HEPATITIS C AB TEST: CPT

## 2024-08-13 PROCEDURE — 85027 COMPLETE CBC AUTOMATED: CPT

## 2024-08-13 PROCEDURE — G8419 CALC BMI OUT NRM PARAM NOF/U: HCPCS | Performed by: NURSE PRACTITIONER

## 2024-08-13 PROCEDURE — 36415 COLL VENOUS BLD VENIPUNCTURE: CPT

## 2024-08-13 PROCEDURE — G8427 DOCREV CUR MEDS BY ELIG CLIN: HCPCS | Performed by: NURSE PRACTITIONER

## 2024-08-13 PROCEDURE — 1036F TOBACCO NON-USER: CPT | Performed by: NURSE PRACTITIONER

## 2024-08-13 PROCEDURE — 3080F DIAST BP >= 90 MM HG: CPT | Performed by: NURSE PRACTITIONER

## 2024-08-13 PROCEDURE — 83036 HEMOGLOBIN GLYCOSYLATED A1C: CPT

## 2024-08-13 PROCEDURE — 99204 OFFICE O/P NEW MOD 45 MIN: CPT | Performed by: NURSE PRACTITIONER

## 2024-08-13 PROCEDURE — 3075F SYST BP GE 130 - 139MM HG: CPT | Performed by: NURSE PRACTITIONER

## 2024-08-13 PROCEDURE — 87389 HIV-1 AG W/HIV-1&-2 AB AG IA: CPT

## 2024-08-13 PROCEDURE — 3017F COLORECTAL CA SCREEN DOC REV: CPT | Performed by: NURSE PRACTITIONER

## 2024-08-13 PROCEDURE — 84443 ASSAY THYROID STIM HORMONE: CPT

## 2024-08-13 RX ORDER — ASPIRIN 81 MG/1
81 TABLET ORAL DAILY
Qty: 90 TABLET | Refills: 1 | Status: SHIPPED | OUTPATIENT
Start: 2024-08-13

## 2024-08-13 RX ORDER — ESCITALOPRAM OXALATE 10 MG/1
10 TABLET ORAL DAILY
Qty: 30 TABLET | Refills: 3 | Status: SHIPPED | OUTPATIENT
Start: 2024-08-13

## 2024-08-13 RX ORDER — LISINOPRIL 20 MG/1
20 TABLET ORAL DAILY
Qty: 90 TABLET | Refills: 1 | Status: SHIPPED | OUTPATIENT
Start: 2024-08-13

## 2024-08-13 RX ORDER — ATORVASTATIN CALCIUM 40 MG/1
40 TABLET, FILM COATED ORAL DAILY
Qty: 90 TABLET | Refills: 1 | Status: SHIPPED | OUTPATIENT
Start: 2024-08-13

## 2024-08-13 SDOH — ECONOMIC STABILITY: FOOD INSECURITY: WITHIN THE PAST 12 MONTHS, THE FOOD YOU BOUGHT JUST DIDN'T LAST AND YOU DIDN'T HAVE MONEY TO GET MORE.: NEVER TRUE

## 2024-08-13 SDOH — ECONOMIC STABILITY: FOOD INSECURITY: WITHIN THE PAST 12 MONTHS, YOU WORRIED THAT YOUR FOOD WOULD RUN OUT BEFORE YOU GOT MONEY TO BUY MORE.: NEVER TRUE

## 2024-08-13 SDOH — ECONOMIC STABILITY: INCOME INSECURITY: HOW HARD IS IT FOR YOU TO PAY FOR THE VERY BASICS LIKE FOOD, HOUSING, MEDICAL CARE, AND HEATING?: NOT HARD AT ALL

## 2024-08-13 ASSESSMENT — ANXIETY QUESTIONNAIRES
1. FEELING NERVOUS, ANXIOUS, OR ON EDGE: NEARLY EVERY DAY
6. BECOMING EASILY ANNOYED OR IRRITABLE: NEARLY EVERY DAY
4. TROUBLE RELAXING: NEARLY EVERY DAY
7. FEELING AFRAID AS IF SOMETHING AWFUL MIGHT HAPPEN: NEARLY EVERY DAY
3. WORRYING TOO MUCH ABOUT DIFFERENT THINGS: NEARLY EVERY DAY
GAD7 TOTAL SCORE: 21
2. NOT BEING ABLE TO STOP OR CONTROL WORRYING: NEARLY EVERY DAY
5. BEING SO RESTLESS THAT IT IS HARD TO SIT STILL: NEARLY EVERY DAY
IF YOU CHECKED OFF ANY PROBLEMS ON THIS QUESTIONNAIRE, HOW DIFFICULT HAVE THESE PROBLEMS MADE IT FOR YOU TO DO YOUR WORK, TAKE CARE OF THINGS AT HOME, OR GET ALONG WITH OTHER PEOPLE: VERY DIFFICULT

## 2024-08-13 ASSESSMENT — ENCOUNTER SYMPTOMS
ABDOMINAL PAIN: 0
SINUS PAIN: 0
VOMITING: 0
SORE THROAT: 0
SHORTNESS OF BREATH: 0
BACK PAIN: 0
SINUS PRESSURE: 0
PHOTOPHOBIA: 0
CHEST TIGHTNESS: 0
DIARRHEA: 0
COLOR CHANGE: 0
NAUSEA: 0
COUGH: 0

## 2024-08-13 ASSESSMENT — PATIENT HEALTH QUESTIONNAIRE - PHQ9
SUM OF ALL RESPONSES TO PHQ QUESTIONS 1-9: 0
1. LITTLE INTEREST OR PLEASURE IN DOING THINGS: NOT AT ALL
SUM OF ALL RESPONSES TO PHQ QUESTIONS 1-9: 0
SUM OF ALL RESPONSES TO PHQ9 QUESTIONS 1 & 2: 0
SUM OF ALL RESPONSES TO PHQ QUESTIONS 1-9: 0
SUM OF ALL RESPONSES TO PHQ QUESTIONS 1-9: 0
2. FEELING DOWN, DEPRESSED OR HOPELESS: NOT AT ALL

## 2024-08-13 NOTE — PROGRESS NOTES
[facility]  [facility]  [address] [facility] [address]  Dept: [phone]       Ayden Thornton is a 59 y.o. male who presents today for his  medical conditions/complaintsas noted below.  Ayden Thornton is c/o of New Patient, Establish Care, Hypertension, and Anxiety      HPI:     HPI      This is a pleasant 59-year-old male who presents today to establish care.  Has been several years since he has had continual PCP follow-up.  No significant underlying medical history.  Previous smoker.  States he quit approximately 13 years ago.  Only uses occasional alcohol and does vape occasionally.  Denies any marijuana or illicit drug use.    Multiple concerns today.  Has been under a significant amount of stress related to marital issues and recent deaths in the family.  Has been monitoring his blood pressure and has been noting to have blood pressure readings consistent with 160/100 starting about 3 months ago.  At that time, he cut out caffeine (endorses drinking 15 cans of Mountain Dew daily) I did not have any improvement in his blood pressure.  On his own accord, he started taking a family members leftover lisinopril at 20 mg.  He does bring in blood pressure readings with him today with 2 weeks worth of readings while he was taking lisinopril.  Blood pressure adequately controlled minus a few outlying readings.  States he tolerated medication without issue.  Patient education provided and Long discussion had.  At this time, given patient tolerated well will continue with lisinopril 20 mg daily and patient to continue with ambulatory blood pressure monitoring.  Also needs to have lab work done to monitor renal function.  Goal blood pressure remains 140/80.  Patient advised to call if consistently having elevated blood pressures.  Continue exercise and continued avoidance of caffeine.    Significant increase in anxiety.  [id number] as noted below.

## 2024-09-04 LAB — NONINV COLON CA DNA+OCC BLD SCRN STL QL: NEGATIVE

## 2024-10-04 ENCOUNTER — TELEMEDICINE (OUTPATIENT)
Dept: PRIMARY CARE CLINIC | Age: 59
End: 2024-10-04
Payer: COMMERCIAL

## 2024-10-04 DIAGNOSIS — F41.9 ANXIETY: Primary | ICD-10-CM

## 2024-10-04 DIAGNOSIS — I10 PRIMARY HYPERTENSION: ICD-10-CM

## 2024-10-04 PROCEDURE — G8427 DOCREV CUR MEDS BY ELIG CLIN: HCPCS | Performed by: NURSE PRACTITIONER

## 2024-10-04 PROCEDURE — 3017F COLORECTAL CA SCREEN DOC REV: CPT | Performed by: NURSE PRACTITIONER

## 2024-10-04 PROCEDURE — G8484 FLU IMMUNIZE NO ADMIN: HCPCS | Performed by: NURSE PRACTITIONER

## 2024-10-04 PROCEDURE — G8419 CALC BMI OUT NRM PARAM NOF/U: HCPCS | Performed by: NURSE PRACTITIONER

## 2024-10-04 PROCEDURE — 1036F TOBACCO NON-USER: CPT | Performed by: NURSE PRACTITIONER

## 2024-10-04 PROCEDURE — 99214 OFFICE O/P EST MOD 30 MIN: CPT | Performed by: NURSE PRACTITIONER

## 2024-10-04 RX ORDER — ESCITALOPRAM OXALATE 20 MG/1
20 TABLET ORAL DAILY
Qty: 90 TABLET | Refills: 1 | Status: SHIPPED | OUTPATIENT
Start: 2024-10-04 | End: 2025-04-02

## 2024-10-04 ASSESSMENT — ENCOUNTER SYMPTOMS
BACK PAIN: 0
COUGH: 0
ABDOMINAL PAIN: 0
PHOTOPHOBIA: 0
VOMITING: 0
DIARRHEA: 0
COLOR CHANGE: 0
SINUS PRESSURE: 0
NAUSEA: 0
SINUS PAIN: 0
SHORTNESS OF BREATH: 0
SORE THROAT: 0
CHEST TIGHTNESS: 0

## 2024-10-04 NOTE — PROGRESS NOTES
Ayden Thornton, was evaluated through a synchronous (real-time) audio-video encounter. The patient (or guardian if applicable) is aware that this is a billable service, which includes applicable co-pays. This Virtual Visit was conducted with patient's (and/or legal guardian's) consent. Patient identification was verified, and a caregiver was present when appropriate.   The patient was located at Other: work vehicle  Provider was located at Facility ([facility]): [address] [place] [address] Main Floor  [place] [id number]  Confirm you are appropriately licensed, registered, or certified to deliver care in the state where the patient is located as indicated above. If you are not or unsure, please re-schedule the visit: Yes, I confirm.     Ayden Thornton (:  1965) is a Established patient, presenting virtually for evaluation of the following:      Below is the assessment and plan developed based on review of pertinent history, physical exam, labs, studies, and medications.     Assessment & Plan  Anxiety   Chronic, not at goal (unstable), continue current treatment plan    Orders:    escitalopram (LEXAPRO) 20 MG tablet; Take 1 tablet by mouth daily    Primary hypertension   Chronic, at goal (stable), continue current treatment plan           No follow-ups on file.       Subjective   HPI    BP better controlled on lisinopril.  Reported readings as follows: 123/86, 103/77, 101/85 and 99/73. Feeling well and tolerating medication without ADR.  Also on statin.  Tolerating.  Discussed in detail LDL results and triglycerides and rationale behind statin.  Verbalizes understanding and will repeat lipids on next visit.     Anxiety has improved on lexapro.  No panic attacks since our last visit. Tolerating without ADR.  Still have generalized anxiety most days with chest tightness and irritability.  Overall better, but not at goal.  Discussed increasing dose.  Increased dose sent to pharmacy> 20mg

## 2024-10-04 NOTE — ASSESSMENT & PLAN NOTE
Chronic, not at goal (unstable), continue current treatment plan    Orders:    escitalopram (LEXAPRO) 20 MG tablet; Take 1 tablet by mouth daily

## 2025-02-03 DIAGNOSIS — I10 PRIMARY HYPERTENSION: ICD-10-CM

## 2025-02-03 RX ORDER — LISINOPRIL 20 MG/1
20 TABLET ORAL DAILY
Qty: 90 TABLET | Refills: 1 | Status: SHIPPED | OUTPATIENT
Start: 2025-02-03

## 2025-02-03 RX ORDER — ATORVASTATIN CALCIUM 40 MG/1
40 TABLET, FILM COATED ORAL DAILY
Qty: 90 TABLET | Refills: 1 | Status: SHIPPED | OUTPATIENT
Start: 2025-02-03

## 2025-04-04 DIAGNOSIS — F41.9 ANXIETY: ICD-10-CM

## 2025-04-04 RX ORDER — ESCITALOPRAM OXALATE 20 MG/1
20 TABLET ORAL DAILY
Qty: 90 TABLET | Refills: 1 | OUTPATIENT
Start: 2025-04-04 | End: 2025-10-01

## 2025-04-09 DIAGNOSIS — F41.9 ANXIETY: ICD-10-CM

## 2025-04-10 RX ORDER — ESCITALOPRAM OXALATE 20 MG/1
20 TABLET ORAL DAILY
Qty: 90 TABLET | Refills: 1 | OUTPATIENT
Start: 2025-04-10 | End: 2025-10-07

## 2025-04-15 SDOH — ECONOMIC STABILITY: FOOD INSECURITY: WITHIN THE PAST 12 MONTHS, YOU WORRIED THAT YOUR FOOD WOULD RUN OUT BEFORE YOU GOT MONEY TO BUY MORE.: NEVER TRUE

## 2025-04-15 SDOH — ECONOMIC STABILITY: FOOD INSECURITY: WITHIN THE PAST 12 MONTHS, THE FOOD YOU BOUGHT JUST DIDN'T LAST AND YOU DIDN'T HAVE MONEY TO GET MORE.: NEVER TRUE

## 2025-04-15 SDOH — ECONOMIC STABILITY: TRANSPORTATION INSECURITY
IN THE PAST 12 MONTHS, HAS THE LACK OF TRANSPORTATION KEPT YOU FROM MEDICAL APPOINTMENTS OR FROM GETTING MEDICATIONS?: NO

## 2025-04-15 SDOH — ECONOMIC STABILITY: TRANSPORTATION INSECURITY
IN THE PAST 12 MONTHS, HAS LACK OF TRANSPORTATION KEPT YOU FROM MEETINGS, WORK, OR FROM GETTING THINGS NEEDED FOR DAILY LIVING?: NO

## 2025-04-15 SDOH — ECONOMIC STABILITY: INCOME INSECURITY: IN THE LAST 12 MONTHS, WAS THERE A TIME WHEN YOU WERE NOT ABLE TO PAY THE MORTGAGE OR RENT ON TIME?: NO

## 2025-04-15 ASSESSMENT — PATIENT HEALTH QUESTIONNAIRE - PHQ9
SUM OF ALL RESPONSES TO PHQ QUESTIONS 1-9: 1
SUM OF ALL RESPONSES TO PHQ QUESTIONS 1-9: 1
2. FEELING DOWN, DEPRESSED OR HOPELESS: SEVERAL DAYS
SUM OF ALL RESPONSES TO PHQ QUESTIONS 1-9: 1
SUM OF ALL RESPONSES TO PHQ QUESTIONS 1-9: 1
2. FEELING DOWN, DEPRESSED OR HOPELESS: SEVERAL DAYS
SUM OF ALL RESPONSES TO PHQ9 QUESTIONS 1 & 2: 1
1. LITTLE INTEREST OR PLEASURE IN DOING THINGS: NOT AT ALL
1. LITTLE INTEREST OR PLEASURE IN DOING THINGS: NOT AT ALL

## 2025-04-16 ENCOUNTER — OFFICE VISIT (OUTPATIENT)
Dept: PRIMARY CARE CLINIC | Age: 60
End: 2025-04-16
Payer: COMMERCIAL

## 2025-04-16 VITALS
DIASTOLIC BLOOD PRESSURE: 81 MMHG | HEART RATE: 74 BPM | OXYGEN SATURATION: 100 % | WEIGHT: 201.2 LBS | BODY MASS INDEX: 27.25 KG/M2 | SYSTOLIC BLOOD PRESSURE: 132 MMHG | HEIGHT: 72 IN

## 2025-04-16 DIAGNOSIS — I10 PRIMARY HYPERTENSION: ICD-10-CM

## 2025-04-16 DIAGNOSIS — F41.9 ANXIETY: Primary | ICD-10-CM

## 2025-04-16 PROCEDURE — 3075F SYST BP GE 130 - 139MM HG: CPT | Performed by: NURSE PRACTITIONER

## 2025-04-16 PROCEDURE — G8427 DOCREV CUR MEDS BY ELIG CLIN: HCPCS | Performed by: NURSE PRACTITIONER

## 2025-04-16 PROCEDURE — 99214 OFFICE O/P EST MOD 30 MIN: CPT | Performed by: NURSE PRACTITIONER

## 2025-04-16 PROCEDURE — 1036F TOBACCO NON-USER: CPT | Performed by: NURSE PRACTITIONER

## 2025-04-16 PROCEDURE — 3079F DIAST BP 80-89 MM HG: CPT | Performed by: NURSE PRACTITIONER

## 2025-04-16 PROCEDURE — 3017F COLORECTAL CA SCREEN DOC REV: CPT | Performed by: NURSE PRACTITIONER

## 2025-04-16 PROCEDURE — G8419 CALC BMI OUT NRM PARAM NOF/U: HCPCS | Performed by: NURSE PRACTITIONER

## 2025-04-16 RX ORDER — ESCITALOPRAM OXALATE 20 MG/1
20 TABLET ORAL DAILY
Qty: 90 TABLET | Refills: 3 | Status: SHIPPED | OUTPATIENT
Start: 2025-04-16 | End: 2026-04-11

## 2025-04-16 RX ORDER — ATORVASTATIN CALCIUM 40 MG/1
40 TABLET, FILM COATED ORAL DAILY
Qty: 90 TABLET | Refills: 3 | Status: SHIPPED | OUTPATIENT
Start: 2025-04-16

## 2025-04-16 RX ORDER — ASPIRIN 81 MG/1
81 TABLET ORAL DAILY
Qty: 90 TABLET | Refills: 3 | Status: SHIPPED | OUTPATIENT
Start: 2025-04-16

## 2025-04-16 RX ORDER — LISINOPRIL 20 MG/1
20 TABLET ORAL DAILY
Qty: 90 TABLET | Refills: 3 | Status: SHIPPED | OUTPATIENT
Start: 2025-04-16

## 2025-04-16 ASSESSMENT — ENCOUNTER SYMPTOMS
COUGH: 0
DIARRHEA: 0
SINUS PAIN: 0
SHORTNESS OF BREATH: 0
VOMITING: 0
CHEST TIGHTNESS: 0
SORE THROAT: 0
NAUSEA: 0
PHOTOPHOBIA: 0
ABDOMINAL PAIN: 0
SINUS PRESSURE: 0
BACK PAIN: 0
COLOR CHANGE: 0

## 2025-04-16 NOTE — PROGRESS NOTES
[address] [facility] MAIN FLOOR  [facility] [address]   4/16/2025    Ayden Thornton is a 59 y.o. male who presents today for his medical conditions and/or complaints as noted below.    Ayden Thornton is scheduled today for Medication Refill      HPI:     History of Present Illness  The patient is a 59-year-old male who returns today for a 6-month follow-up and medication refills.    Mood improvement attributed to Lexapro. The medication has enhanced tolerance levels, allowing for better contemplation before speaking, increased patience with children, and improved performance at work as a . The positive effects were not immediately apparent until discontinuation for a week, during which withdrawal symptoms such as a sensation of electrical shock from the neck to the shoulders and upper spine were experienced. Additionally, nightmares and disrupted sleep patterns with frequent awakenings due to distressing dreams were noted.     Despite these side effects, the overall benefit of the medication is acknowledged, with noticeable improvements recognized by others. Initial hesitance to start the medication is now replaced by recognition of its necessity in managing his condition.    Daily blood pressure medication is taken, and a heart health chandler ordered through his union at work provides a machine for blood pressure monitoring. Successful abstinence from caffeine for the past year is reported, without withdrawal symptoms such as headaches. He has replaced caffeine with lemonade.     Statin is taken for cholesterol management.     Low-dose aspirin is purchased from [facility] due to a lack of refills, despite advice to start aspirin and statin and follow healthy diet and lifestyle measures.    SOCIAL HISTORY  He works as a . He has been off caffeine for a year.      Vitals:    04/16/25 0859   BP: 132/81   BP Site: Left Upper Arm   Patient Position: Sitting   BP Cuff Size: Large

## (undated) DEVICE — SUTURE VCRL SZ 2-0 L27IN ABSRB UD L26MM CT-2 1/2 CIR J269H

## (undated) DEVICE — NDL CNTR 40CT FM MAG: Brand: MEDLINE INDUSTRIES, INC.

## (undated) DEVICE — SMARTGOWN SURGICAL GOWN, 3XL, LONG: Brand: CONVERTORS

## (undated) DEVICE — HOLDING PIN: Brand: ANCHORAGE

## (undated) DEVICE — GLOVE SURG SZ 85 L12IN FNGR THK79MIL GRN LTX FREE

## (undated) DEVICE — BLANKET WRM W29.9XL79.1IN UP BODY FORC AIR MISTRAL-AIR

## (undated) DEVICE — BANDAGE COMPR W6INXL12FT SMOOTH FOR LIMB EXSANG ESMARCH

## (undated) DEVICE — GARMENT,MEDLINE,DVT,INT,CALF,MED, GEN2: Brand: MEDLINE

## (undated) DEVICE — SKIN PREP TRAY W/CHG: Brand: MEDLINE INDUSTRIES, INC.

## (undated) DEVICE — BNDG,ELSTC,MATRIX,STRL,6"X5YD,LF,HOOK&LP: Brand: MEDLINE

## (undated) DEVICE — INTENDED FOR TISSUE SEPARATION, AND OTHER PROCEDURES THAT REQUIRE A SHARP SURGICAL BLADE TO PUNCTURE OR CUT.: Brand: BARD-PARKER ® CARBON RIB-BACK BLADES

## (undated) DEVICE — GAUZE,SPONGE,FLUFF,6"X6.75",STRL,5/TRAY: Brand: MEDLINE

## (undated) DEVICE — DRAPE,U/ SHT,SPLIT,PLAS,STERIL: Brand: MEDLINE

## (undated) DEVICE — UNTHREADED GUIDE WIRE: Brand: FIXOS

## (undated) DEVICE — PADDING,UNDERCAST,COTTON, 4"X4YD STERILE: Brand: MEDLINE

## (undated) DEVICE — TOWEL,OR,DSP,ST,BLUE,DLX,XR,4/PK,20PK/CS: Brand: MEDLINE

## (undated) DEVICE — SPONGE LAP W18XL18IN WHT COT 4 PLY FLD STRUNG RADPQ DISP ST

## (undated) DEVICE — 3M™ STERI-DRAPE™ INCISE DRAPE 1050 (60CM X 45CM): Brand: STERI-DRAPE™

## (undated) DEVICE — GLOVE SURG SZ 8 CRM LTX FREE POLYISOPRENE POLYMER BEAD ANTI

## (undated) DEVICE — Device

## (undated) DEVICE — 3M™ STERI-DRAPE™ U-DRAPE 1015: Brand: STERI-DRAPE™

## (undated) DEVICE — APPLICATOR MEDICATED 26 CC SOLUTION HI LT ORNG CHLORAPREP

## (undated) DEVICE — DRAPE,REIN 53X77,STERILE: Brand: MEDLINE

## (undated) DEVICE — CONTAINER,SPECIMEN,OR STERILE,4OZ: Brand: MEDLINE

## (undated) DEVICE — SUTURE ETHLN SZ 3-0 L18IN NONABSORBABLE BLK PS-2 L19MM 3/8 1669H

## (undated) DEVICE — STRIP WND CLSR W1 4XL4IN POLYAMIDE MACROPOROUS NONWOVEN H2O

## (undated) DEVICE — C-ARM: Brand: UNBRANDED

## (undated) DEVICE — DRESSING PETRO W3XL8IN OIL EMUL N ADH GZ KNIT IMPREG CELOS

## (undated) DEVICE — C-ARMOR C-ARM EQUIPMENT COVERS CLEAR STERILE UNIVERSAL FIT 12 PER CASE: Brand: C-ARMOR

## (undated) DEVICE — CANNULATED DRILL